# Patient Record
Sex: FEMALE | Race: WHITE | Employment: OTHER | URBAN - NONMETROPOLITAN AREA
[De-identification: names, ages, dates, MRNs, and addresses within clinical notes are randomized per-mention and may not be internally consistent; named-entity substitution may affect disease eponyms.]

---

## 2019-10-17 ENCOUNTER — HOSPITAL ENCOUNTER (OUTPATIENT)
Dept: PREADMISSION TESTING | Age: 79
Discharge: HOME | End: 2019-10-21
Payer: MEDICARE

## 2019-10-17 VITALS — HEIGHT: 62 IN | BODY MASS INDEX: 31.28 KG/M2 | WEIGHT: 170 LBS

## 2019-10-17 LAB
ANION GAP SERPL CALCULATED.3IONS-SCNC: 15 MMOL/L (ref 7–19)
APTT: 28.1 SEC (ref 26–36.2)
BACTERIA: ABNORMAL /HPF
BASOPHILS ABSOLUTE: 0.1 K/UL (ref 0–0.2)
BASOPHILS RELATIVE PERCENT: 0.9 % (ref 0–1)
BILIRUBIN URINE: NEGATIVE
BLOOD, URINE: NEGATIVE
BUN BLDV-MCNC: 26 MG/DL (ref 8–23)
CALCIUM SERPL-MCNC: 10.3 MG/DL (ref 8.8–10.2)
CHLORIDE BLD-SCNC: 103 MMOL/L (ref 98–111)
CLARITY: ABNORMAL
CO2: 25 MMOL/L (ref 22–29)
COLOR: YELLOW
CREAT SERPL-MCNC: 1.1 MG/DL (ref 0.5–0.9)
EOSINOPHILS ABSOLUTE: 0.2 K/UL (ref 0–0.6)
EOSINOPHILS RELATIVE PERCENT: 2.5 % (ref 0–5)
EPITHELIAL CELLS, UA: 4 /HPF (ref 0–5)
GFR NON-AFRICAN AMERICAN: 48
GLUCOSE BLD-MCNC: 142 MG/DL (ref 74–109)
GLUCOSE URINE: NEGATIVE MG/DL
HCT VFR BLD CALC: 40.7 % (ref 37–47)
HEMOGLOBIN: 13.5 G/DL (ref 12–16)
HYALINE CASTS: 5 /HPF (ref 0–8)
IMMATURE GRANULOCYTES #: 0 K/UL
INR BLD: 1.02 (ref 0.88–1.18)
KETONES, URINE: NEGATIVE MG/DL
LEUKOCYTE ESTERASE, URINE: ABNORMAL
LYMPHOCYTES ABSOLUTE: 2 K/UL (ref 1.1–4.5)
LYMPHOCYTES RELATIVE PERCENT: 26.5 % (ref 20–40)
MCH RBC QN AUTO: 30.5 PG (ref 27–31)
MCHC RBC AUTO-ENTMCNC: 33.2 G/DL (ref 33–37)
MCV RBC AUTO: 91.9 FL (ref 81–99)
MONOCYTES ABSOLUTE: 0.5 K/UL (ref 0–0.9)
MONOCYTES RELATIVE PERCENT: 7.2 % (ref 0–10)
NEUTROPHILS ABSOLUTE: 4.7 K/UL (ref 1.5–7.5)
NEUTROPHILS RELATIVE PERCENT: 62.6 % (ref 50–65)
NITRITE, URINE: NEGATIVE
PDW BLD-RTO: 12.7 % (ref 11.5–14.5)
PH UA: 6 (ref 5–8)
PLATELET # BLD: 280 K/UL (ref 130–400)
PMV BLD AUTO: 10.7 FL (ref 9.4–12.3)
POTASSIUM SERPL-SCNC: 3.6 MMOL/L (ref 3.5–5)
PROTEIN UA: NEGATIVE MG/DL
PROTHROMBIN TIME: 12.8 SEC (ref 12–14.6)
RBC # BLD: 4.43 M/UL (ref 4.2–5.4)
RBC UA: 4 /HPF (ref 0–4)
SODIUM BLD-SCNC: 143 MMOL/L (ref 136–145)
SPECIFIC GRAVITY UA: 1.02 (ref 1–1.03)
URINE REFLEX TO CULTURE: YES
UROBILINOGEN, URINE: 0.2 E.U./DL
WBC # BLD: 7.5 K/UL (ref 4.8–10.8)
WBC UA: 53 /HPF (ref 0–5)

## 2019-10-17 PROCEDURE — 87077 CULTURE AEROBIC IDENTIFY: CPT

## 2019-10-17 PROCEDURE — 85610 PROTHROMBIN TIME: CPT

## 2019-10-17 PROCEDURE — 87086 URINE CULTURE/COLONY COUNT: CPT

## 2019-10-17 PROCEDURE — 87186 SC STD MICRODIL/AGAR DIL: CPT

## 2019-10-17 PROCEDURE — 93005 ELECTROCARDIOGRAM TRACING: CPT | Performed by: ORTHOPAEDIC SURGERY

## 2019-10-17 PROCEDURE — 81001 URINALYSIS AUTO W/SCOPE: CPT

## 2019-10-17 PROCEDURE — 87081 CULTURE SCREEN ONLY: CPT

## 2019-10-17 PROCEDURE — 85730 THROMBOPLASTIN TIME PARTIAL: CPT

## 2019-10-17 PROCEDURE — 80048 BASIC METABOLIC PNL TOTAL CA: CPT

## 2019-10-17 PROCEDURE — 85025 COMPLETE CBC W/AUTO DIFF WBC: CPT

## 2019-10-17 RX ORDER — VITAMIN E 268 MG
400 CAPSULE ORAL DAILY
COMMUNITY

## 2019-10-17 RX ORDER — LANOLIN ALCOHOL/MO/W.PET/CERES
500 CREAM (GRAM) TOPICAL NIGHTLY
COMMUNITY
End: 2020-01-29 | Stop reason: ALTCHOICE

## 2019-10-17 RX ORDER — NICOTINE 14MG/24HR
250 PATCH, TRANSDERMAL 24 HOURS TRANSDERMAL DAILY
COMMUNITY

## 2019-10-17 RX ORDER — VENLAFAXINE HYDROCHLORIDE 150 MG/1
150 TABLET, EXTENDED RELEASE ORAL
COMMUNITY

## 2019-10-17 RX ORDER — M-VIT,TX,IRON,MINS/CALC/FOLIC 27MG-0.4MG
1 TABLET ORAL DAILY
COMMUNITY

## 2019-10-17 RX ORDER — ECHINACEA 400 MG
1000 CAPSULE ORAL DAILY
COMMUNITY

## 2019-10-17 RX ORDER — HYDROCHLOROTHIAZIDE 25 MG/1
25 TABLET ORAL DAILY
COMMUNITY

## 2019-10-17 RX ORDER — ACETAMINOPHEN 500 MG
500 TABLET ORAL EVERY 6 HOURS PRN
COMMUNITY

## 2019-10-17 RX ORDER — BENAZEPRIL HYDROCHLORIDE 20 MG/1
20 TABLET ORAL DAILY
COMMUNITY
End: 2020-01-29 | Stop reason: ALTCHOICE

## 2019-10-17 RX ORDER — AMLODIPINE BESYLATE 10 MG/1
10 TABLET ORAL DAILY
COMMUNITY

## 2019-10-17 RX ORDER — ASPIRIN 325 MG
325 TABLET ORAL DAILY
Status: ON HOLD | COMMUNITY
End: 2019-11-01 | Stop reason: HOSPADM

## 2019-10-17 RX ORDER — COVID-19 ANTIGEN TEST
220 KIT MISCELLANEOUS 2 TIMES DAILY
COMMUNITY
End: 2020-01-29

## 2019-10-17 RX ORDER — ZOLPIDEM TARTRATE 5 MG/1
5 TABLET ORAL NIGHTLY
COMMUNITY

## 2019-10-18 LAB
EKG P AXIS: -35 DEGREES
EKG P-R INTERVAL: 212 MS
EKG Q-T INTERVAL: 408 MS
EKG QRS DURATION: 84 MS
EKG QTC CALCULATION (BAZETT): 425 MS
EKG T AXIS: 57 DEGREES
MRSA CULTURE ONLY: ABNORMAL
ORGANISM: ABNORMAL

## 2019-10-18 PROCEDURE — 93010 ELECTROCARDIOGRAM REPORT: CPT | Performed by: INTERNAL MEDICINE

## 2019-10-19 LAB
ORGANISM: ABNORMAL
URINE CULTURE, ROUTINE: ABNORMAL
URINE CULTURE, ROUTINE: ABNORMAL

## 2019-10-31 ENCOUNTER — HOSPITAL ENCOUNTER (INPATIENT)
Age: 79
LOS: 1 days | Discharge: HOME | DRG: 470 | End: 2019-11-01
Attending: ORTHOPAEDIC SURGERY | Admitting: ORTHOPAEDIC SURGERY
Payer: MEDICARE

## 2019-10-31 ENCOUNTER — ANESTHESIA (OUTPATIENT)
Dept: OPERATING ROOM | Age: 79
DRG: 470 | End: 2019-10-31
Payer: MEDICARE

## 2019-10-31 ENCOUNTER — ANESTHESIA EVENT (OUTPATIENT)
Dept: OPERATING ROOM | Age: 79
DRG: 470 | End: 2019-10-31
Payer: MEDICARE

## 2019-10-31 VITALS — DIASTOLIC BLOOD PRESSURE: 48 MMHG | SYSTOLIC BLOOD PRESSURE: 86 MMHG | OXYGEN SATURATION: 95 % | TEMPERATURE: 96.8 F

## 2019-10-31 PROBLEM — M17.12 PRIMARY OSTEOARTHRITIS OF LEFT KNEE: Status: ACTIVE | Noted: 2019-10-31

## 2019-10-31 LAB
ABO/RH: NORMAL
ANTIBODY SCREEN: NORMAL

## 2019-10-31 PROCEDURE — 3600000015 HC SURGERY LEVEL 5 ADDTL 15MIN: Performed by: ORTHOPAEDIC SURGERY

## 2019-10-31 PROCEDURE — 97161 PT EVAL LOW COMPLEX 20 MIN: CPT

## 2019-10-31 PROCEDURE — 2580000003 HC RX 258: Performed by: ORTHOPAEDIC SURGERY

## 2019-10-31 PROCEDURE — 3700000000 HC ANESTHESIA ATTENDED CARE: Performed by: ORTHOPAEDIC SURGERY

## 2019-10-31 PROCEDURE — 86850 RBC ANTIBODY SCREEN: CPT

## 2019-10-31 PROCEDURE — 2709999900 HC NON-CHARGEABLE SUPPLY: Performed by: ORTHOPAEDIC SURGERY

## 2019-10-31 PROCEDURE — 1210000000 HC MED SURG R&B

## 2019-10-31 PROCEDURE — C1776 JOINT DEVICE (IMPLANTABLE): HCPCS | Performed by: ORTHOPAEDIC SURGERY

## 2019-10-31 PROCEDURE — 0SRD0J9 REPLACEMENT OF LEFT KNEE JOINT WITH SYNTHETIC SUBSTITUTE, CEMENTED, OPEN APPROACH: ICD-10-PCS | Performed by: ORTHOPAEDIC SURGERY

## 2019-10-31 PROCEDURE — 6370000000 HC RX 637 (ALT 250 FOR IP): Performed by: ORTHOPAEDIC SURGERY

## 2019-10-31 PROCEDURE — 2500000003 HC RX 250 WO HCPCS: Performed by: ANESTHESIOLOGY

## 2019-10-31 PROCEDURE — 6360000002 HC RX W HCPCS: Performed by: ORTHOPAEDIC SURGERY

## 2019-10-31 PROCEDURE — C1713 ANCHOR/SCREW BN/BN,TIS/BN: HCPCS | Performed by: ORTHOPAEDIC SURGERY

## 2019-10-31 PROCEDURE — 86901 BLOOD TYPING SEROLOGIC RH(D): CPT

## 2019-10-31 PROCEDURE — 2700000000 HC OXYGEN THERAPY PER DAY

## 2019-10-31 PROCEDURE — 7100000000 HC PACU RECOVERY - FIRST 15 MIN: Performed by: ORTHOPAEDIC SURGERY

## 2019-10-31 PROCEDURE — 36415 COLL VENOUS BLD VENIPUNCTURE: CPT

## 2019-10-31 PROCEDURE — 2500000003 HC RX 250 WO HCPCS: Performed by: NURSE ANESTHETIST, CERTIFIED REGISTERED

## 2019-10-31 PROCEDURE — 86900 BLOOD TYPING SEROLOGIC ABO: CPT

## 2019-10-31 PROCEDURE — 3600000005 HC SURGERY LEVEL 5 BASE: Performed by: ORTHOPAEDIC SURGERY

## 2019-10-31 PROCEDURE — 6360000002 HC RX W HCPCS: Performed by: NURSE ANESTHETIST, CERTIFIED REGISTERED

## 2019-10-31 PROCEDURE — 97530 THERAPEUTIC ACTIVITIES: CPT

## 2019-10-31 PROCEDURE — 7100000001 HC PACU RECOVERY - ADDTL 15 MIN: Performed by: ORTHOPAEDIC SURGERY

## 2019-10-31 PROCEDURE — 6360000002 HC RX W HCPCS: Performed by: ANESTHESIOLOGY

## 2019-10-31 PROCEDURE — 3700000001 HC ADD 15 MINUTES (ANESTHESIA): Performed by: ORTHOPAEDIC SURGERY

## 2019-10-31 PROCEDURE — 76942 ECHO GUIDE FOR BIOPSY: CPT | Performed by: NURSE ANESTHETIST, CERTIFIED REGISTERED

## 2019-10-31 PROCEDURE — 2500000003 HC RX 250 WO HCPCS: Performed by: ORTHOPAEDIC SURGERY

## 2019-10-31 DEVICE — COMPONENT FEM SZ 5 NAR L KNEE CO CHROM CEM POST STBL COR: Type: IMPLANTABLE DEVICE | Site: KNEE | Status: FUNCTIONAL

## 2019-10-31 DEVICE — PSN TIB STM 5 DEG SZ C L: Type: IMPLANTABLE DEVICE | Site: KNEE | Status: FUNCTIONAL

## 2019-10-31 DEVICE — CEMENT BNE 40GM HI VISC RADPQ FOR REV SURG: Type: IMPLANTABLE DEVICE | Site: KNEE | Status: FUNCTIONAL

## 2019-10-31 DEVICE — IMPL KNEE ASF PSN CPS 10MM VE L 3-5 CD: Type: IMPLANTABLE DEVICE | Site: KNEE | Status: FUNCTIONAL

## 2019-10-31 DEVICE — EXTENSION STEM L30MM DIA14MM KNEE TAPR CEM PERSONA: Type: IMPLANTABLE DEVICE | Site: KNEE | Status: FUNCTIONAL

## 2019-10-31 DEVICE — NEXGEN ALL-POLY PATELLA 26MM: Type: IMPLANTABLE DEVICE | Site: KNEE | Status: FUNCTIONAL

## 2019-10-31 RX ORDER — MORPHINE SULFATE 4 MG/ML
4 INJECTION, SOLUTION INTRAMUSCULAR; INTRAVENOUS
Status: DISCONTINUED | OUTPATIENT
Start: 2019-10-31 | End: 2019-11-01 | Stop reason: HOSPADM

## 2019-10-31 RX ORDER — SODIUM CHLORIDE 0.9 % (FLUSH) 0.9 %
10 SYRINGE (ML) INJECTION PRN
Status: DISCONTINUED | OUTPATIENT
Start: 2019-10-31 | End: 2019-11-01 | Stop reason: HOSPADM

## 2019-10-31 RX ORDER — MIDAZOLAM HYDROCHLORIDE 1 MG/ML
2 INJECTION INTRAMUSCULAR; INTRAVENOUS ONCE
Status: COMPLETED | OUTPATIENT
Start: 2019-10-31 | End: 2019-10-31

## 2019-10-31 RX ORDER — MIDAZOLAM HYDROCHLORIDE 1 MG/ML
2 INJECTION INTRAMUSCULAR; INTRAVENOUS
Status: DISCONTINUED | OUTPATIENT
Start: 2019-10-31 | End: 2019-10-31 | Stop reason: HOSPADM

## 2019-10-31 RX ORDER — FENTANYL CITRATE 50 UG/ML
25 INJECTION, SOLUTION INTRAMUSCULAR; INTRAVENOUS
Status: DISCONTINUED | OUTPATIENT
Start: 2019-10-31 | End: 2019-10-31 | Stop reason: HOSPADM

## 2019-10-31 RX ORDER — VITAMIN B COMPLEX
1000 TABLET ORAL DAILY
Status: DISCONTINUED | OUTPATIENT
Start: 2019-10-31 | End: 2019-11-01 | Stop reason: HOSPADM

## 2019-10-31 RX ORDER — LIDOCAINE HYDROCHLORIDE 10 MG/ML
1 INJECTION, SOLUTION EPIDURAL; INFILTRATION; INTRACAUDAL; PERINEURAL
Status: DISCONTINUED | OUTPATIENT
Start: 2019-10-31 | End: 2019-10-31 | Stop reason: HOSPADM

## 2019-10-31 RX ORDER — OXYCODONE HCL 10 MG/1
10 TABLET, FILM COATED, EXTENDED RELEASE ORAL
Status: COMPLETED | OUTPATIENT
Start: 2019-10-31 | End: 2019-10-31

## 2019-10-31 RX ORDER — DEXAMETHASONE SODIUM PHOSPHATE 10 MG/ML
INJECTION INTRAMUSCULAR; INTRAVENOUS PRN
Status: DISCONTINUED | OUTPATIENT
Start: 2019-10-31 | End: 2019-10-31 | Stop reason: SDUPTHER

## 2019-10-31 RX ORDER — ROPIVACAINE HYDROCHLORIDE 2 MG/ML
INJECTION, SOLUTION EPIDURAL; INFILTRATION; PERINEURAL PRN
Status: DISCONTINUED | OUTPATIENT
Start: 2019-10-31 | End: 2019-10-31 | Stop reason: HOSPADM

## 2019-10-31 RX ORDER — IBUPROFEN 400 MG/1
400 TABLET ORAL
Status: DISCONTINUED | OUTPATIENT
Start: 2019-10-31 | End: 2019-11-01

## 2019-10-31 RX ORDER — AMLODIPINE BESYLATE 10 MG/1
10 TABLET ORAL DAILY
Status: DISCONTINUED | OUTPATIENT
Start: 2019-10-31 | End: 2019-11-01 | Stop reason: HOSPADM

## 2019-10-31 RX ORDER — FENTANYL CITRATE 50 UG/ML
INJECTION, SOLUTION INTRAMUSCULAR; INTRAVENOUS PRN
Status: DISCONTINUED | OUTPATIENT
Start: 2019-10-31 | End: 2019-10-31 | Stop reason: SDUPTHER

## 2019-10-31 RX ORDER — SCOLOPAMINE TRANSDERMAL SYSTEM 1 MG/1
1 PATCH, EXTENDED RELEASE TRANSDERMAL ONCE
Status: DISCONTINUED | OUTPATIENT
Start: 2019-10-31 | End: 2019-10-31

## 2019-10-31 RX ORDER — VENLAFAXINE HYDROCHLORIDE 75 MG/1
150 CAPSULE, EXTENDED RELEASE ORAL
Status: DISCONTINUED | OUTPATIENT
Start: 2019-11-01 | End: 2019-11-01 | Stop reason: HOSPADM

## 2019-10-31 RX ORDER — SODIUM CHLORIDE 0.9 % (FLUSH) 0.9 %
10 SYRINGE (ML) INJECTION PRN
Status: DISCONTINUED | OUTPATIENT
Start: 2019-10-31 | End: 2019-10-31 | Stop reason: HOSPADM

## 2019-10-31 RX ORDER — ZOLPIDEM TARTRATE 5 MG/1
5 TABLET ORAL NIGHTLY
Status: DISCONTINUED | OUTPATIENT
Start: 2019-10-31 | End: 2019-11-01 | Stop reason: HOSPADM

## 2019-10-31 RX ORDER — ACETAMINOPHEN 325 MG/1
650 TABLET ORAL EVERY 6 HOURS
Status: DISCONTINUED | OUTPATIENT
Start: 2019-10-31 | End: 2019-11-01 | Stop reason: HOSPADM

## 2019-10-31 RX ORDER — BUPIVACAINE HYDROCHLORIDE 5 MG/ML
INJECTION, SOLUTION EPIDURAL; INTRACAUDAL
Status: COMPLETED | OUTPATIENT
Start: 2019-10-31 | End: 2019-10-31

## 2019-10-31 RX ORDER — ACETAMINOPHEN 500 MG
1000 TABLET ORAL ONCE
Status: COMPLETED | OUTPATIENT
Start: 2019-10-31 | End: 2019-10-31

## 2019-10-31 RX ORDER — ONDANSETRON 2 MG/ML
4 INJECTION INTRAMUSCULAR; INTRAVENOUS EVERY 6 HOURS PRN
Status: DISCONTINUED | OUTPATIENT
Start: 2019-10-31 | End: 2019-11-01 | Stop reason: HOSPADM

## 2019-10-31 RX ORDER — OXYCODONE HYDROCHLORIDE 5 MG/1
10 TABLET ORAL EVERY 4 HOURS PRN
Status: DISCONTINUED | OUTPATIENT
Start: 2019-10-31 | End: 2019-11-01 | Stop reason: HOSPADM

## 2019-10-31 RX ORDER — SODIUM CHLORIDE 9 MG/ML
INJECTION, SOLUTION INTRAVENOUS CONTINUOUS
Status: DISCONTINUED | OUTPATIENT
Start: 2019-10-31 | End: 2019-11-01 | Stop reason: HOSPADM

## 2019-10-31 RX ORDER — CELECOXIB 100 MG/1
100 CAPSULE ORAL ONCE
Status: COMPLETED | OUTPATIENT
Start: 2019-10-31 | End: 2019-10-31

## 2019-10-31 RX ORDER — LANOLIN ALCOHOL/MO/W.PET/CERES
500 CREAM (GRAM) TOPICAL NIGHTLY
Status: DISCONTINUED | OUTPATIENT
Start: 2019-10-31 | End: 2019-11-01 | Stop reason: HOSPADM

## 2019-10-31 RX ORDER — MEPERIDINE HYDROCHLORIDE 50 MG/ML
12.5 INJECTION INTRAMUSCULAR; INTRAVENOUS; SUBCUTANEOUS EVERY 5 MIN PRN
Status: DISCONTINUED | OUTPATIENT
Start: 2019-10-31 | End: 2019-10-31 | Stop reason: HOSPADM

## 2019-10-31 RX ORDER — MORPHINE SULFATE 4 MG/ML
2 INJECTION, SOLUTION INTRAMUSCULAR; INTRAVENOUS
Status: DISCONTINUED | OUTPATIENT
Start: 2019-10-31 | End: 2019-11-01 | Stop reason: HOSPADM

## 2019-10-31 RX ORDER — ONDANSETRON 2 MG/ML
INJECTION INTRAMUSCULAR; INTRAVENOUS PRN
Status: DISCONTINUED | OUTPATIENT
Start: 2019-10-31 | End: 2019-10-31 | Stop reason: SDUPTHER

## 2019-10-31 RX ORDER — LACTOBACILLUS RHAMNOSUS GG 10B CELL
1 CAPSULE ORAL DAILY
Status: DISCONTINUED | OUTPATIENT
Start: 2019-10-31 | End: 2019-11-01 | Stop reason: HOSPADM

## 2019-10-31 RX ORDER — DIPHENHYDRAMINE HYDROCHLORIDE 50 MG/ML
12.5 INJECTION INTRAMUSCULAR; INTRAVENOUS
Status: DISCONTINUED | OUTPATIENT
Start: 2019-10-31 | End: 2019-10-31 | Stop reason: HOSPADM

## 2019-10-31 RX ORDER — SODIUM CHLORIDE 0.9 % (FLUSH) 0.9 %
10 SYRINGE (ML) INJECTION EVERY 12 HOURS SCHEDULED
Status: DISCONTINUED | OUTPATIENT
Start: 2019-10-31 | End: 2019-11-01 | Stop reason: HOSPADM

## 2019-10-31 RX ORDER — LISINOPRIL 5 MG/1
5 TABLET ORAL DAILY
Status: DISCONTINUED | OUTPATIENT
Start: 2019-10-31 | End: 2019-11-01 | Stop reason: HOSPADM

## 2019-10-31 RX ORDER — FENTANYL CITRATE 50 UG/ML
50 INJECTION, SOLUTION INTRAMUSCULAR; INTRAVENOUS
Status: DISCONTINUED | OUTPATIENT
Start: 2019-10-31 | End: 2019-10-31 | Stop reason: HOSPADM

## 2019-10-31 RX ORDER — BUPIVACAINE HYDROCHLORIDE 7.5 MG/ML
INJECTION, SOLUTION INTRASPINAL PRN
Status: DISCONTINUED | OUTPATIENT
Start: 2019-10-31 | End: 2019-10-31 | Stop reason: SDUPTHER

## 2019-10-31 RX ORDER — 0.9 % SODIUM CHLORIDE 0.9 %
500 INTRAVENOUS SOLUTION INTRAVENOUS PRN
Status: DISCONTINUED | OUTPATIENT
Start: 2019-10-31 | End: 2019-11-01 | Stop reason: HOSPADM

## 2019-10-31 RX ORDER — OXYCODONE HYDROCHLORIDE 5 MG/1
5 TABLET ORAL EVERY 4 HOURS PRN
Status: DISCONTINUED | OUTPATIENT
Start: 2019-10-31 | End: 2019-11-01 | Stop reason: HOSPADM

## 2019-10-31 RX ORDER — DOCUSATE SODIUM 100 MG/1
100 CAPSULE, LIQUID FILLED ORAL 2 TIMES DAILY
Status: DISCONTINUED | OUTPATIENT
Start: 2019-10-31 | End: 2019-11-01 | Stop reason: HOSPADM

## 2019-10-31 RX ORDER — SODIUM CHLORIDE, SODIUM LACTATE, POTASSIUM CHLORIDE, CALCIUM CHLORIDE 600; 310; 30; 20 MG/100ML; MG/100ML; MG/100ML; MG/100ML
INJECTION, SOLUTION INTRAVENOUS CONTINUOUS
Status: DISCONTINUED | OUTPATIENT
Start: 2019-10-31 | End: 2019-10-31 | Stop reason: SDUPTHER

## 2019-10-31 RX ORDER — NAPROXEN 250 MG/1
250 TABLET ORAL 2 TIMES DAILY
Status: DISCONTINUED | OUTPATIENT
Start: 2019-10-31 | End: 2019-10-31 | Stop reason: SDUPTHER

## 2019-10-31 RX ORDER — PROPOFOL 10 MG/ML
INJECTION, EMULSION INTRAVENOUS CONTINUOUS PRN
Status: DISCONTINUED | OUTPATIENT
Start: 2019-10-31 | End: 2019-10-31 | Stop reason: SDUPTHER

## 2019-10-31 RX ORDER — LIDOCAINE HYDROCHLORIDE 10 MG/ML
INJECTION, SOLUTION INFILTRATION; PERINEURAL PRN
Status: DISCONTINUED | OUTPATIENT
Start: 2019-10-31 | End: 2019-10-31 | Stop reason: SDUPTHER

## 2019-10-31 RX ORDER — ECHINACEA 400 MG
1000 CAPSULE ORAL DAILY
Status: DISCONTINUED | OUTPATIENT
Start: 2019-10-31 | End: 2019-10-31 | Stop reason: RX

## 2019-10-31 RX ORDER — PROPOFOL 10 MG/ML
INJECTION, EMULSION INTRAVENOUS PRN
Status: DISCONTINUED | OUTPATIENT
Start: 2019-10-31 | End: 2019-10-31

## 2019-10-31 RX ORDER — SENNA AND DOCUSATE SODIUM 50; 8.6 MG/1; MG/1
1 TABLET, FILM COATED ORAL 2 TIMES DAILY
Status: DISCONTINUED | OUTPATIENT
Start: 2019-10-31 | End: 2019-11-01 | Stop reason: HOSPADM

## 2019-10-31 RX ORDER — HYDROCHLOROTHIAZIDE 25 MG/1
25 TABLET ORAL DAILY
Status: DISCONTINUED | OUTPATIENT
Start: 2019-10-31 | End: 2019-11-01 | Stop reason: HOSPADM

## 2019-10-31 RX ORDER — ENALAPRILAT 2.5 MG/2ML
1.25 INJECTION INTRAVENOUS
Status: DISCONTINUED | OUTPATIENT
Start: 2019-10-31 | End: 2019-10-31 | Stop reason: HOSPADM

## 2019-10-31 RX ORDER — SODIUM CHLORIDE, SODIUM LACTATE, POTASSIUM CHLORIDE, CALCIUM CHLORIDE 600; 310; 30; 20 MG/100ML; MG/100ML; MG/100ML; MG/100ML
INJECTION, SOLUTION INTRAVENOUS CONTINUOUS
Status: DISCONTINUED | OUTPATIENT
Start: 2019-10-31 | End: 2019-10-31

## 2019-10-31 RX ORDER — LABETALOL 20 MG/4 ML (5 MG/ML) INTRAVENOUS SYRINGE
5 EVERY 10 MIN PRN
Status: DISCONTINUED | OUTPATIENT
Start: 2019-10-31 | End: 2019-10-31 | Stop reason: HOSPADM

## 2019-10-31 RX ORDER — SODIUM CHLORIDE 0.9 % (FLUSH) 0.9 %
10 SYRINGE (ML) INJECTION EVERY 12 HOURS SCHEDULED
Status: DISCONTINUED | OUTPATIENT
Start: 2019-10-31 | End: 2019-10-31 | Stop reason: HOSPADM

## 2019-10-31 RX ORDER — SODIUM CHLORIDE, SODIUM LACTATE, POTASSIUM CHLORIDE, CALCIUM CHLORIDE 600; 310; 30; 20 MG/100ML; MG/100ML; MG/100ML; MG/100ML
INJECTION, SOLUTION INTRAVENOUS CONTINUOUS
Status: DISCONTINUED | OUTPATIENT
Start: 2019-10-31 | End: 2019-11-01 | Stop reason: HOSPADM

## 2019-10-31 RX ORDER — ONDANSETRON 2 MG/ML
4 INJECTION INTRAMUSCULAR; INTRAVENOUS
Status: DISCONTINUED | OUTPATIENT
Start: 2019-10-31 | End: 2019-10-31 | Stop reason: HOSPADM

## 2019-10-31 RX ORDER — EPHEDRINE SULFATE 50 MG/ML
INJECTION, SOLUTION INTRAVENOUS PRN
Status: DISCONTINUED | OUTPATIENT
Start: 2019-10-31 | End: 2019-10-31 | Stop reason: SDUPTHER

## 2019-10-31 RX ORDER — TRANEXAMIC ACID 650 1/1
1950 TABLET ORAL
Status: COMPLETED | OUTPATIENT
Start: 2019-10-31 | End: 2019-10-31

## 2019-10-31 RX ORDER — HYDRALAZINE HYDROCHLORIDE 20 MG/ML
5 INJECTION INTRAMUSCULAR; INTRAVENOUS EVERY 10 MIN PRN
Status: DISCONTINUED | OUTPATIENT
Start: 2019-10-31 | End: 2019-10-31 | Stop reason: HOSPADM

## 2019-10-31 RX ORDER — M-VIT,TX,IRON,MINS/CALC/FOLIC 27MG-0.4MG
1 TABLET ORAL DAILY
Status: DISCONTINUED | OUTPATIENT
Start: 2019-10-31 | End: 2019-11-01 | Stop reason: HOSPADM

## 2019-10-31 RX ADMIN — BUPIVACAINE HYDROCHLORIDE 20 ML: 5 INJECTION, SOLUTION EPIDURAL; INTRACAUDAL; PERINEURAL at 07:16

## 2019-10-31 RX ADMIN — PHENYLEPHRINE HYDROCHLORIDE 100 MCG: 10 INJECTION INTRAVENOUS at 08:28

## 2019-10-31 RX ADMIN — BUPIVACAINE HYDROCHLORIDE 1.4 ML: 5 INJECTION, SOLUTION EPIDURAL; INTRACAUDAL; PERINEURAL at 07:53

## 2019-10-31 RX ADMIN — OXYCODONE HYDROCHLORIDE 10 MG: 5 TABLET ORAL at 12:52

## 2019-10-31 RX ADMIN — IBUPROFEN 400 MG: 400 TABLET, FILM COATED ORAL at 12:52

## 2019-10-31 RX ADMIN — MORPHINE SULFATE 2 MG: 4 INJECTION INTRAVENOUS at 20:26

## 2019-10-31 RX ADMIN — MORPHINE SULFATE 2 MG: 4 INJECTION INTRAVENOUS at 16:17

## 2019-10-31 RX ADMIN — SENNOSIDES AND DOCUSATE SODIUM 1 TABLET: 8.6; 5 TABLET ORAL at 12:51

## 2019-10-31 RX ADMIN — SODIUM CHLORIDE, SODIUM LACTATE, POTASSIUM CHLORIDE, AND CALCIUM CHLORIDE: 600; 310; 30; 20 INJECTION, SOLUTION INTRAVENOUS at 12:54

## 2019-10-31 RX ADMIN — MIDAZOLAM 1 MG: 1 INJECTION INTRAMUSCULAR; INTRAVENOUS at 07:12

## 2019-10-31 RX ADMIN — DEXAMETHASONE SODIUM PHOSPHATE 10 MG: 10 INJECTION INTRAMUSCULAR; INTRAVENOUS at 08:05

## 2019-10-31 RX ADMIN — PHENYLEPHRINE HYDROCHLORIDE 100 MCG: 10 INJECTION INTRAVENOUS at 08:25

## 2019-10-31 RX ADMIN — PHENYLEPHRINE HYDROCHLORIDE 100 MCG: 10 INJECTION INTRAVENOUS at 08:22

## 2019-10-31 RX ADMIN — IBUPROFEN 400 MG: 400 TABLET, FILM COATED ORAL at 16:20

## 2019-10-31 RX ADMIN — PROPOFOL 160 MCG/KG/MIN: 10 INJECTION, EMULSION INTRAVENOUS at 07:51

## 2019-10-31 RX ADMIN — ASPIRIN 325 MG: 325 TABLET, DELAYED RELEASE ORAL at 20:27

## 2019-10-31 RX ADMIN — SENNOSIDES AND DOCUSATE SODIUM 1 TABLET: 8.6; 5 TABLET ORAL at 20:27

## 2019-10-31 RX ADMIN — Medication 2 G: at 23:55

## 2019-10-31 RX ADMIN — EPHEDRINE SULFATE 10 MG: 50 INJECTION INTRAMUSCULAR; INTRAVENOUS; SUBCUTANEOUS at 08:07

## 2019-10-31 RX ADMIN — FENTANYL CITRATE 50 MCG: 50 INJECTION INTRAMUSCULAR; INTRAVENOUS at 07:41

## 2019-10-31 RX ADMIN — MULTIPLE VITAMINS W/ MINERALS TAB 1 TABLET: TAB at 12:52

## 2019-10-31 RX ADMIN — CELECOXIB 100 MG: 100 CAPSULE ORAL at 06:33

## 2019-10-31 RX ADMIN — Medication 500 MG: at 20:26

## 2019-10-31 RX ADMIN — DOCUSATE SODIUM 100 MG: 100 CAPSULE, LIQUID FILLED ORAL at 20:27

## 2019-10-31 RX ADMIN — ACETAMINOPHEN 650 MG: 325 TABLET ORAL at 12:52

## 2019-10-31 RX ADMIN — DOCUSATE SODIUM 100 MG: 100 CAPSULE, LIQUID FILLED ORAL at 12:54

## 2019-10-31 RX ADMIN — TRANEXAMIC ACID 1950 MG: 650 TABLET ORAL at 06:33

## 2019-10-31 RX ADMIN — ASPIRIN 325 MG: 325 TABLET, DELAYED RELEASE ORAL at 12:51

## 2019-10-31 RX ADMIN — ACETAMINOPHEN 650 MG: 325 TABLET ORAL at 16:20

## 2019-10-31 RX ADMIN — PHENYLEPHRINE HYDROCHLORIDE 100 MCG: 10 INJECTION INTRAVENOUS at 08:10

## 2019-10-31 RX ADMIN — EPHEDRINE SULFATE 10 MG: 50 INJECTION INTRAMUSCULAR; INTRAVENOUS; SUBCUTANEOUS at 08:04

## 2019-10-31 RX ADMIN — SODIUM CHLORIDE, SODIUM LACTATE, POTASSIUM CHLORIDE, AND CALCIUM CHLORIDE: 600; 310; 30; 20 INJECTION, SOLUTION INTRAVENOUS at 06:34

## 2019-10-31 RX ADMIN — OXYCODONE HYDROCHLORIDE 10 MG: 10 TABLET, FILM COATED, EXTENDED RELEASE ORAL at 06:33

## 2019-10-31 RX ADMIN — SODIUM CHLORIDE, SODIUM LACTATE, POTASSIUM CHLORIDE, AND CALCIUM CHLORIDE: 600; 310; 30; 20 INJECTION, SOLUTION INTRAVENOUS at 07:39

## 2019-10-31 RX ADMIN — Medication 2 G: at 16:19

## 2019-10-31 RX ADMIN — ACETAMINOPHEN 1000 MG: 500 TABLET ORAL at 06:33

## 2019-10-31 RX ADMIN — Medication 2 G: at 08:03

## 2019-10-31 RX ADMIN — OXYCODONE HYDROCHLORIDE 10 MG: 5 TABLET ORAL at 18:35

## 2019-10-31 RX ADMIN — OXYCODONE HYDROCHLORIDE 10 MG: 5 TABLET ORAL at 22:31

## 2019-10-31 RX ADMIN — FENTANYL CITRATE 50 MCG: 50 INJECTION INTRAMUSCULAR; INTRAVENOUS at 07:43

## 2019-10-31 RX ADMIN — ONDANSETRON HYDROCHLORIDE 4 MG: 2 INJECTION, SOLUTION INTRAMUSCULAR; INTRAVENOUS at 09:15

## 2019-10-31 RX ADMIN — PHENYLEPHRINE HYDROCHLORIDE 100 MCG: 10 INJECTION INTRAVENOUS at 08:32

## 2019-10-31 RX ADMIN — LIDOCAINE HYDROCHLORIDE 50 MG: 10 INJECTION, SOLUTION INFILTRATION; PERINEURAL at 07:56

## 2019-10-31 RX ADMIN — ZOLPIDEM TARTRATE 5 MG: 5 TABLET ORAL at 20:27

## 2019-10-31 RX ADMIN — VITAMIN D, TAB 1000IU (100/BT) 1000 UNITS: 25 TAB at 12:52

## 2019-10-31 RX ADMIN — ACETAMINOPHEN 650 MG: 325 TABLET ORAL at 22:31

## 2019-10-31 RX ADMIN — BUPIVACAINE HYDROCHLORIDE IN DEXTROSE 1.6 ML: 7.5 INJECTION, SOLUTION SUBARACHNOID at 07:48

## 2019-10-31 ASSESSMENT — PAIN DESCRIPTION - DESCRIPTORS
DESCRIPTORS: DISCOMFORT
DESCRIPTORS: SHOOTING;DISCOMFORT
DESCRIPTORS: DISCOMFORT

## 2019-10-31 ASSESSMENT — PAIN DESCRIPTION - ORIENTATION
ORIENTATION: LEFT

## 2019-10-31 ASSESSMENT — PAIN SCALES - GENERAL
PAINLEVEL_OUTOF10: 7
PAINLEVEL_OUTOF10: 3
PAINLEVEL_OUTOF10: 7
PAINLEVEL_OUTOF10: 4
PAINLEVEL_OUTOF10: 7
PAINLEVEL_OUTOF10: 3
PAINLEVEL_OUTOF10: 6
PAINLEVEL_OUTOF10: 5

## 2019-10-31 ASSESSMENT — PAIN DESCRIPTION - ONSET: ONSET: ON-GOING

## 2019-10-31 ASSESSMENT — PAIN DESCRIPTION - LOCATION
LOCATION: KNEE

## 2019-10-31 ASSESSMENT — PAIN DESCRIPTION - PAIN TYPE
TYPE: ACUTE PAIN
TYPE: SURGICAL PAIN

## 2019-10-31 ASSESSMENT — LIFESTYLE VARIABLES: SMOKING_STATUS: 0

## 2019-10-31 ASSESSMENT — ENCOUNTER SYMPTOMS: SHORTNESS OF BREATH: 0

## 2019-10-31 ASSESSMENT — PAIN DESCRIPTION - FREQUENCY: FREQUENCY: INTERMITTENT

## 2019-10-31 ASSESSMENT — PAIN DESCRIPTION - PROGRESSION: CLINICAL_PROGRESSION: GRADUALLY WORSENING

## 2019-10-31 ASSESSMENT — PAIN - FUNCTIONAL ASSESSMENT: PAIN_FUNCTIONAL_ASSESSMENT: PREVENTS OR INTERFERES SOME ACTIVE ACTIVITIES AND ADLS

## 2019-10-31 NOTE — H&P
[facility] ([facility]) Pre-Operative History and Physical    Patient Name: Luis Daniel Reese  : 1940        Chief Complaint:  left knee pain  History of Present Illness: This patient has had ongoing pain for several weeks/months that has been unresponsive to conservative care which has included injection, therapy, activity modification and presents now for surgery. Past Medical History:       Diagnosis Date    Cancer ([address]     melanoma    Hypertension     Retention of urine      Past Surgical History:       Procedure Laterality Date    CAROTID ENDARTERECTOMY Left 2003    [facility]    CARPAL TUNNEL RELEASE Left     HYSTERECTOMY      ROTATOR CUFF REPAIR Bilateral     VARICOSE VEIN SURGERY         Medications:   Prior to Admission medications    Medication Sig Start Date End Date Taking? Authorizing Provider   hydrochlorothiazide (HYDRODIURIL) 25 MG tablet Take 25 mg by mouth daily   Yes Historical Provider, MD   venlafaxine 150 MG extended release tablet Take 150 mg by mouth daily (with breakfast)   Yes Historical Provider, MD   benazepril (LOTENSIN) 20 MG tablet Take 20 mg by mouth daily   Yes Historical Provider, MD   amLODIPine (NORVASC) 10 MG tablet Take 10 mg by mouth daily   Yes Historical Provider, MD   zolpidem (AMBIEN) 5 MG tablet Take 5 mg by mouth nightly.    Yes Historical Provider, MD   Saccharomyces boulardii (PROBIOTIC) 250 MG CAPS Take 250 mg by mouth daily   Yes Historical Provider, MD   vitamin D (CHOLECALCIFEROL) 1000 UNIT TABS tablet Take 1,000 Units by mouth daily   Yes Historical Provider, MD   vitamin E 400 UNIT capsule Take 400 Units by mouth daily   Yes Historical Provider, MD   Multiple Vitamins-Minerals (THERAPEUTIC MULTIVITAMIN-MINERALS) tablet Take 1 tablet by mouth daily   Yes Historical Provider, MD   Naproxen Sodium (ALEVE) 220 MG CAPS Take 220 mg by mouth 2 times daily   Yes Historical Provider, MD   Flaxseed, Linseed, (FLAXSEED OIL) 1000 MG CAPS Take 1,000 mg by mouth daily   Yes motion and generalized tenderness. Neurovascular exam normal  SKIN: warm, dry with no rashes or lesions  LYMPH: No cervical or inguinal lymphadenopathy    RADIOLOGY: xrays of extremity show left severe knee arthritis  LABORATORY:    CBC :    Lab Results   Component Value Date    WBC 7.5 10/17/2019    HGB 13.5 10/17/2019    HCT 40.7 10/17/2019     10/17/2019     BMP:   Lab Results   Component Value Date     10/17/2019    K 3.6 10/17/2019     10/17/2019    CO2 25 10/17/2019    BUN 26 10/17/2019    CREATININE 1.1 10/17/2019    CALCIUM 10.3 10/17/2019    LABGLOM 48 10/17/2019    GLUCOSE 142 10/17/2019     PT/INR:    Lab Results   Component Value Date    PROTIME 12.8 10/17/2019    INR 1.02 10/17/2019     U/A:   Lab Results   Component Value Date    WBCUA 53 10/17/2019    RBCUA 4 10/17/2019    BACTERIA TRACE 10/17/2019     HgBA1c:  No components found for: HGBA1C    IMPRESSION:   left primary knee osteoarthritis. Most recent office note reviewed and there has been no change in health status. PLAN: left Knee replacement. I explained to the patient/family the patient's diagnosis and operative procedure in detail. They said they understood basically what was wrong and how I planned to fix it. They understand the expected recovery and the risks which include excessive bleeding, infection, reaction to anesthesia, nerve injury, stiffness, fracture, deformity and dislocation. They then signed an operative consent form. Provider:  Tahir Arroyo  Date: 10/31/2019

## 2019-10-31 NOTE — OP NOTE
TOTAL KNEE ARTHROPLASTY OPERATIVE NOTE    NAME OF SURGEON / : Santa Gomez MD  PATIENT:   Darcy Pulliam  Date: 10/31/2019        Time: 9:16 AM   Referring Physician: ________________________    PREOP DIAGNOSIS:  left Knee  Primary osteoarthritis   POSTOP DIAGNOSIS:  Same     PROCEDURE:    Left  Cemented Posterior Stabilized Knee arthroplasty    IMPLANTS:   Implant Name Type Inv. Item Serial No.  Lot No. LRB No. Used   CEMENT BONE R 1X40 US Cement CEMENT BONE R 1X40 US  [employer] [id number] Left 1   CEMENT BONE R 1X40 US Cement CEMENT BONE R 1X40 US  [employer] [id number] Left 1   IMPL KNEE ASF PSN CPS 10MM VE L 3-5 CD Knee IMPL KNEE ASF PSN CPS 10MM VE L 3-5 CD  [employer] [id number] Left 1   IMPL KNEE FEM PSN CMT CCR NRW SZ5 L Knee IMPL KNEE FEM PSN CMT CCR NRW SZ5 L  [employer] [id number] Left 1   IMPL KNEE TIB PERSONA LFT 5 DEG STEM SZ C Knee IMPL KNEE TIB PERSONA LFT 5 DEG STEM SZ C  [employer] [id number] Left 1   IMPL KNEE STEM EXT PSN HYB [id number] Knee IMPL KNEE STEM EXT PSN HYB [id number]  [employer] [id number] Left 1   IMPL KNEE PATELLA NXGN ALL POLY 7.5X26MM Knee IMPL KNEE PATELLA NXGN ALL POLY 7.5X26MM  [employer] [id number] Left 1       FINDINGS:  Preop ROM:  Full except for   - 5 degrees  extension  Alignment:    varus  Cartilage wear:  Medial - severe posterior  Lateral - mild  Pat-fem -mild  ACL -Intact    ASSISTANT: Usama Hawkins, certified first assistant. Helped with draping, exposure, retraction, essential steps of the procedure, and with wound closure. ANESTHESIA:  General  EBL:  500 mL  TOURNIQUET:  none  FLUIDS: See anesthesia record  BLOOD PRODUCTS:  None  COMPLICATIONS:  None  SPECIMEN:  None            INDICATIONS:  Patient presents for the above procedure having failed conservative treatment. Patient consents to the procedure above understanding the risks of bleeding, infection, anesthesia, nerve injury, stiffness, and blood clots.     PROCEDURE:  The patient was brought to the operating room and placed in a supine position on the operating table. Anesthesia as specified above was placed. An antiobiotic was given IV. The unoperated extremities were well padded. A tourniquet was placed around the proximal thigh. The lower extremity was prepped with chlorhexidene/alcohol and draped sterilely using ioband barriers. A time out was performed. An anterior knee incision was made and fasciocutaneous flaps were developed. A mini midvastus approach was made and the patella subluxed. The prepatellar fat pad, ACL, and the anterior horns of the menisci were excised. A starter drill was placed down the femoral shaft 1 cm anterior to the PCL origin. An alignment emmanuel was placed down the femoral shaft. The distal femoral cutting guide, set at 5 degrees of valgus was then placed over the alignment emmanuel, and pinned perpendicular to the AP axis. The cutting block was then set to remove an extra 2 mm of distal femur and the distal cut made. The medial bone cut thickness was 11 mm. Hohmans and a PCL retractor were placed around the tibia. The external alignment guide set to cut 10 mm off the intact side at 7° degrees posterior slope was pinned in place. I stepped back and verified that the guide followed the anterior cortex of the tibia to the ankle. The tibial cut was made. Its thickness was 11 mm. The tibial fragment and osteophytes were removed. Posterior meniscal horns were resected. The extension gap was satisfactory. The epicondylar and AP femoral axes were marked with electrocautery. Femoral trials were laid on the distal femur to estimate the appropriate size. The femoral sizer, with posterior paddles set at 3 degrees of external rotation, and an anterior stylus was placed. The sizer reading matched the size predicted by the trial.   The pinholes were drilled for the 4 in 1 femoral cutting block.   This block was impacted on the distal femur and sat flush with the proper rotation relative to the AP and epicondylar axes. A drill was advanced through the anterior slot to check for notching. The femoral block was fixed with medial and lateral screws and the remaining femoral cuts were made. Femoral osteophytes were removed with a rongeur. The flexion gap was satisfactory. Posterior femoral osteophytes were removed with a 3/4 inch curved osteotome and rongeur. The appropriate tibial post punch guide covered the tibia without overhang and sat flush. It was lined up with the median third of the tibial tubercle. The tibia was reamed, then the keel punched. The femoral trial was impacted onto the femur. The box cut was made first with a recip saw and finished with regular saw. The insert for the box was placed. A 10 mm thick, posterior stabilized tibial liner was snapped in place and ROM and stability were checked. The knee had full ROM and symmetric varus/valgus stability in flexion and extension after    *release of  NO RELEASES  * no extra resection was needed   *the appropriate size tibial liner was placed (see above)    Note: Stability to varus/valgus stress(mm):  Extension ( 2  ,  2 ) Mid Flexion (  2  ,  1  ) Flexion (  2  ,  3  )      The maximum patella thickness was 23 mm. The patella as resected with  an oscillating saw and consistent thickness verified with caliper. The trial patella was placed and the overall thickness was restored to 23 mm. A femoral  bone plug placed, the femoral lug holes drilled and the trials were removed. The surfaces were rinsed with pulse lavage and dried. Two batches of cement  were mixed. Appropriate sized implants were cemented in place, a trial tibial liner placed, and the knee held in full extension until cement hardened. The capsule was injected with a Ropivacaine . Excess cement was removed, and the actual tibial liner was snapped in place. No thumbs patella tracking was checked. No release was needed.

## 2019-11-01 VITALS
OXYGEN SATURATION: 95 % | SYSTOLIC BLOOD PRESSURE: 131 MMHG | DIASTOLIC BLOOD PRESSURE: 59 MMHG | HEART RATE: 74 BPM | HEIGHT: 62 IN | BODY MASS INDEX: 30.36 KG/M2 | TEMPERATURE: 98.9 F | RESPIRATION RATE: 20 BRPM | WEIGHT: 165 LBS

## 2019-11-01 PROBLEM — M17.12 ARTHRITIS OF LEFT KNEE: Status: ACTIVE | Noted: 2019-11-01

## 2019-11-01 LAB
ANION GAP SERPL CALCULATED.3IONS-SCNC: 17 MMOL/L (ref 7–19)
BUN BLDV-MCNC: 35 MG/DL (ref 8–23)
CALCIUM SERPL-MCNC: 9.4 MG/DL (ref 8.8–10.2)
CHLORIDE BLD-SCNC: 103 MMOL/L (ref 98–111)
CO2: 20 MMOL/L (ref 22–29)
CREAT SERPL-MCNC: 1.6 MG/DL (ref 0.5–0.9)
GFR NON-AFRICAN AMERICAN: 31
GLUCOSE BLD-MCNC: 156 MG/DL (ref 74–109)
HCT VFR BLD CALC: 32.6 % (ref 37–47)
HEMOGLOBIN: 10.5 G/DL (ref 12–16)
POTASSIUM REFLEX MAGNESIUM: 5 MMOL/L (ref 3.5–5)
SODIUM BLD-SCNC: 140 MMOL/L (ref 136–145)

## 2019-11-01 PROCEDURE — 97530 THERAPEUTIC ACTIVITIES: CPT

## 2019-11-01 PROCEDURE — 97116 GAIT TRAINING THERAPY: CPT

## 2019-11-01 PROCEDURE — 2580000003 HC RX 258: Performed by: ORTHOPAEDIC SURGERY

## 2019-11-01 PROCEDURE — 2580000003 HC RX 258: Performed by: PHYSICIAN ASSISTANT

## 2019-11-01 PROCEDURE — 96360 HYDRATION IV INFUSION INIT: CPT

## 2019-11-01 PROCEDURE — 85018 HEMOGLOBIN: CPT

## 2019-11-01 PROCEDURE — 80048 BASIC METABOLIC PNL TOTAL CA: CPT

## 2019-11-01 PROCEDURE — 96361 HYDRATE IV INFUSION ADD-ON: CPT

## 2019-11-01 PROCEDURE — 6370000000 HC RX 637 (ALT 250 FOR IP): Performed by: ORTHOPAEDIC SURGERY

## 2019-11-01 PROCEDURE — 85014 HEMATOCRIT: CPT

## 2019-11-01 PROCEDURE — G0378 HOSPITAL OBSERVATION PER HR: HCPCS

## 2019-11-01 PROCEDURE — 36415 COLL VENOUS BLD VENIPUNCTURE: CPT

## 2019-11-01 RX ORDER — 0.9 % SODIUM CHLORIDE 0.9 %
500 INTRAVENOUS SOLUTION INTRAVENOUS ONCE
Status: COMPLETED | OUTPATIENT
Start: 2019-11-01 | End: 2019-11-01

## 2019-11-01 RX ORDER — OXYCODONE HYDROCHLORIDE 5 MG/1
5 TABLET ORAL EVERY 4 HOURS PRN
Qty: 35 TABLET | Refills: 0 | Status: SHIPPED | OUTPATIENT
Start: 2019-11-01 | End: 2019-11-04

## 2019-11-01 RX ORDER — DOCUSATE SODIUM 100 MG/1
100 CAPSULE, LIQUID FILLED ORAL DAILY
Qty: 20 CAPSULE | Refills: 0 | Status: SHIPPED | OUTPATIENT
Start: 2019-11-01 | End: 2020-01-29 | Stop reason: ALTCHOICE

## 2019-11-01 RX ADMIN — SENNOSIDES AND DOCUSATE SODIUM 1 TABLET: 8.6; 5 TABLET ORAL at 09:38

## 2019-11-01 RX ADMIN — DOCUSATE SODIUM 100 MG: 100 CAPSULE, LIQUID FILLED ORAL at 09:36

## 2019-11-01 RX ADMIN — ASPIRIN 325 MG: 325 TABLET, DELAYED RELEASE ORAL at 09:35

## 2019-11-01 RX ADMIN — HYDROCHLOROTHIAZIDE 25 MG: 25 TABLET ORAL at 09:36

## 2019-11-01 RX ADMIN — Medication 1 CAPSULE: at 09:35

## 2019-11-01 RX ADMIN — SODIUM CHLORIDE 500 ML: 9 INJECTION, SOLUTION INTRAVENOUS at 08:00

## 2019-11-01 RX ADMIN — VENLAFAXINE HYDROCHLORIDE 150 MG: 75 CAPSULE, EXTENDED RELEASE ORAL at 09:38

## 2019-11-01 RX ADMIN — LISINOPRIL 5 MG: 5 TABLET ORAL at 09:35

## 2019-11-01 RX ADMIN — VITAMIN D, TAB 1000IU (100/BT) 1000 UNITS: 25 TAB at 09:36

## 2019-11-01 RX ADMIN — Medication 10 ML: at 09:36

## 2019-11-01 RX ADMIN — OXYCODONE HYDROCHLORIDE 10 MG: 5 TABLET ORAL at 06:34

## 2019-11-01 RX ADMIN — OXYCODONE HYDROCHLORIDE 10 MG: 5 TABLET ORAL at 10:26

## 2019-11-01 RX ADMIN — MULTIPLE VITAMINS W/ MINERALS TAB 1 TABLET: TAB at 09:35

## 2019-11-01 RX ADMIN — OXYCODONE HYDROCHLORIDE 10 MG: 5 TABLET ORAL at 02:33

## 2019-11-01 RX ADMIN — ACETAMINOPHEN 650 MG: 325 TABLET ORAL at 05:46

## 2019-11-01 RX ADMIN — AMLODIPINE BESYLATE 10 MG: 10 TABLET ORAL at 09:36

## 2019-11-01 ASSESSMENT — PAIN SCALES - GENERAL
PAINLEVEL_OUTOF10: 5
PAINLEVEL_OUTOF10: 7
PAINLEVEL_OUTOF10: 3
PAINLEVEL_OUTOF10: 5
PAINLEVEL_OUTOF10: 5
PAINLEVEL_OUTOF10: 7

## 2019-11-01 ASSESSMENT — PAIN DESCRIPTION - ORIENTATION
ORIENTATION: LEFT

## 2019-11-01 ASSESSMENT — PAIN DESCRIPTION - LOCATION
LOCATION: KNEE

## 2019-11-01 ASSESSMENT — PAIN - FUNCTIONAL ASSESSMENT
PAIN_FUNCTIONAL_ASSESSMENT: PREVENTS OR INTERFERES SOME ACTIVE ACTIVITIES AND ADLS
PAIN_FUNCTIONAL_ASSESSMENT: PREVENTS OR INTERFERES SOME ACTIVE ACTIVITIES AND ADLS

## 2019-11-01 ASSESSMENT — PAIN DESCRIPTION - PROGRESSION
CLINICAL_PROGRESSION: GRADUALLY WORSENING

## 2019-11-01 ASSESSMENT — PAIN DESCRIPTION - DESCRIPTORS
DESCRIPTORS: DISCOMFORT

## 2019-11-01 ASSESSMENT — PAIN DESCRIPTION - PAIN TYPE
TYPE: ACUTE PAIN;SURGICAL PAIN
TYPE: ACUTE PAIN;SURGICAL PAIN

## 2019-11-01 ASSESSMENT — PAIN DESCRIPTION - FREQUENCY
FREQUENCY: INTERMITTENT
FREQUENCY: INTERMITTENT

## 2019-11-01 ASSESSMENT — PAIN DESCRIPTION - ONSET
ONSET: ON-GOING
ONSET: ON-GOING

## 2019-11-01 NOTE — CARE COORDINATION
Home Health referral received. Per Tyler Pina Navigjames,  patient is going outpatient for PT. Will sign off.    Electronically signed by Santana Cruz RN on 11/1/19 at 11:03 AM

## 2019-11-01 NOTE — DISCHARGE SUMMARY
[facility] [address]  Dr. Gregor Reaves  Discharge Summary    The Ly Echevarria is a 70 y.o. female underwent total knee replacement procedure without complication. Ly Echevarria was admitted to the floor following her   recovery in the PACU.      Discharge Diagnosis   Left    Knee Replacement    Current Inpatient Medications    Current Facility-Administered Medications: amLODIPine (NORVASC) tablet 10 mg, 10 mg, Oral, Daily  lisinopril (PRINIVIL;ZESTRIL) tablet 5 mg, 5 mg, Oral, Daily  hydrochlorothiazide (HYDRODIURIL) tablet 25 mg, 25 mg, Oral, Daily  therapeutic multivitamin-minerals 1 tablet, 1 tablet, Oral, Daily  niacin extended release capsule 500 mg, 500 mg, Oral, Nightly  lactobacillus (CULTURELLE) capsule 1 capsule, 1 capsule, Oral, Daily  venlafaxine (EFFEXOR XR) extended release capsule 150 mg, 150 mg, Oral, Daily with breakfast  vitamin D (CHOLECALCIFEROL) tablet 1,000 Units, 1,000 Units, Oral, Daily  zolpidem (AMBIEN) tablet 5 mg, 5 mg, Oral, Nightly  0.9 % sodium chloride bolus, 500 mL, Intravenous, PRN  lactated ringers infusion, , Intravenous, Continuous  sodium chloride flush 0.9 % injection 10 mL, 10 mL, Intravenous, 2 times per day  sodium chloride flush 0.9 % injection 10 mL, 10 mL, Intravenous, PRN  acetaminophen (TYLENOL) tablet 650 mg, 650 mg, Oral, Q6H  oxyCODONE (ROXICODONE) immediate release tablet 5 mg, 5 mg, Oral, Q4H PRN **OR** oxyCODONE (ROXICODONE) immediate release tablet 10 mg, 10 mg, Oral, Q4H PRN  morphine injection 2 mg, 2 mg, Intravenous, Q1H PRN **OR** morphine injection 4 mg, 4 mg, Intravenous, Q1H PRN  docusate sodium (COLACE) capsule 100 mg, 100 mg, Oral, BID  sennosides-docusate sodium (SENOKOT-S) 8.6-50 MG tablet 1 tablet, 1 tablet, Oral, BID  magnesium hydroxide (MILK OF MAGNESIA) 400 MG/5ML suspension 30 mL, 30 mL, Oral, Daily PRN  ondansetron (ZOFRAN) injection 4 mg, 4 mg, Intravenous, Q6H PRN  aspirin EC tablet 325 mg, 325 mg, Oral, BID  ibuprofen (ADVIL;MOTRIN) tablet 400 mg, 400 mg, Oral, TID WC  0.9 % sodium chloride infusion, , Intravenous, Continuous    Post-operatively the patients diet was advanced as tolerated and their incision was checked on POD #1. The incision was clean, dry and intact with no signs of infection. The patient remained neurovascularly intact in the lower extremity and had intact pulses distally. Patients calf remained soft and showed no evidence of DVT. The patient was able to move their leg and ankle/foot without any problems post-operatively. Physical therapy and occupational therapy were consulted and began working with the patient post-operatively. The patient progressed with PT/OT as would be expected and continued to improve through their stay. The patients pain was initially controlled with IV medications but we were able to transition to oral pain medications soon after arrival to the floor and their pain remained under good control through their hospital stay. From a medical standpoint the patient remained stable and continued to have the medicine team follow throughout their stay. Acute postoperative blood loss anemia after joint replacement being monitored with daily hemoglobin/hematocrit. The patients dressing was changed/incison was checked on day of d/c. The patient will be discharged at this time to  Home   with their current diet restrictions and will continue to follow the total knee precautions outlined to them by us and PT/OT. Condition on Discharge: Stable    Plan  Followup at scheduled appointment time (1 month post-op). Patient was instructed on the use of pain medications, the signs and symptoms of infection, and was given our number to call should they have any questions or concerns following discharge.

## 2019-11-01 NOTE — PROGRESS NOTES
Subjective:     Post-Operative Day: 1 NO complaints     Objective:     Patient Vitals for the past 24 hrs:   BP Temp Temp src Pulse Resp SpO2 Height Weight   11/01/19 0256 121/67 99 °F (37.2 °C) Temporal 94 16 96 % -- --   10/31/19 2219 (!) 115/56 98.7 °F (37.1 °C) Temporal 81 16 93 % -- --   10/31/19 1852 (!) 156/77 99 °F (37.2 °C) Temporal 99 16 93 % -- --   10/31/19 1607 116/64 99.2 °F (37.3 °C) Temporal 93 18 96 % -- --   10/31/19 1441 136/77 97.8 °F (36.6 °C) Temporal 102 18 95 % -- --   10/31/19 1336 109/74 98 °F (36.7 °C) Temporal 97 18 93 % -- --   10/31/19 1128 -- -- -- -- 20 93 % -- --   10/31/19 1038 (!) 118/53 97.5 °F (36.4 °C) Temporal 73 18 93 % -- --   10/31/19 1030 (!) 118/48 -- -- 70 10 95 % -- --   10/31/19 1025 (!) 124/44 -- -- 70 10 94 % -- --   10/31/19 1020 (!) 118/48 96.9 °F (36.1 °C) Temporal 68 9 95 % -- --   10/31/19 1015 (!) 93/52 -- -- 68 13 95 % -- --   10/31/19 1010 (!) 121/43 -- -- 67 11 92 % -- --   10/31/19 1005 (!) 108/42 -- -- 66 9 94 % -- --   10/31/19 1000 (!) 109/45 -- -- 69 11 94 % -- --   10/31/19 0955 (!) 113/51 -- -- 69 10 94 % -- --   10/31/19 0950 (!) 113/51 -- -- 70 11 96 % -- --   10/31/19 0945 (!) 101/44 -- -- 69 12 95 % -- --   10/31/19 0940 (!) 105/46 -- -- 71 13 95 % -- --   10/31/19 0935 (!) 105/46 -- -- 72 11 96 % -- --   10/31/19 0931 (!) 100/41 96.9 °F (36.1 °C) Temporal 76 25 98 % -- --   10/31/19 0618 (!) 165/69 97.2 °F (36.2 °C) Temporal 81 18 94 % 5' 2\" (1.575 m) 165 lb (74.8 kg)       General: alert, appears stated age and cooperative   Wound: Dressing clean,dry and intact and mod swelling    Neurovascular: Exam normal   DVT Exam: No evidence of DVT seen on physical exam.         Data Review:  Recent Labs     11/01/19  0330   HGB 10.5*     Recent Labs     11/01/19  0330      K 5.0   CREATININE 1.6*   GLUCOSE 156*     No results for input(s): POCGLU in the last 72 hours.   No orders to display       Assessment:     Status Post left Total Knee

## 2019-11-01 NOTE — CONSULTS
Vital Signs   All pre-op and post op vitals reviewed           Physical Exam:  Constitutional: oriented to person, place, and time. appears well-developed. HEENT:   Head: Normocephalic and atraumatic. Eyes: Pupils are equal, round, and reactive to light. Neck: Neck supple. Cardiovascular: Regular rhythm and normal heart sounds. Pulmonary/Chest: Effort normal and breath sounds normal. CTAB  Abdominal: Soft. Bowel sounds are normal. He exhibits no distension. There is no tenderness. There is no rebound and no guarding. Musculoskeletal: Normal range of motion. no edema or tenderness. Post-op changes noted  Neurological:  alert and oriented to person, place, and time. normal reflexes. No focal deficits  Skin: Skin is warm and dry. No new rashes appreciated. Results Review:   I reviewed the patient's new imaging results and agree with the interpretation. Active Problems: Treatment recommendations    Primary osteoarthritis of left knee  Slow transit constipation--bowel regimen  GEOFF--see below  Essential hypertension-HCTZ, Lotensin, Norvasc  Carotid occlusive disease--previous carotid enterectomy  Postoperative blood loss anemia--stable, expected, being monitored with daily labs  GERD--antireflux measures      Acute kidney injury. 3 AM labs showing GFR down to 31. Blood pressure normal potential plans for discharge today. Recommendations are to give IV fluid bolus and recheck labs at noon  Discussed with patient if discharge today will need close follow-up with PCP MARTÍNEZ [place], [place]  She is unaware of any history of kidney disease preop labs reviewed with her as well. Advised DC daily use of NSAIDs  Early mobilization work with therapy  Incentive spirometry q. one hours while awake  Medically stable 73-year-old female postop day #1 will need to keep close monitor at this time.   Continue course of care monitor for changes  Recommendations otherwise as above    I discussed the patients findings and my recommendations with patient/family, staff and the Orthopaedic team.  Yoel Ours  11/01/19  7:17 AM      Chronic kidney disease- keep hydrated. Avoid nephrotoxic medications including NSAIDs  Follow-up with primary care provider 1-2 weeks to recheck creatinine. Medically stable for discharge home. Pt was seen for medical consult during the acute care setting, they will return to their primary care provider and have been instructed to follow up with them concerning abnormal lab/x-rays. We will be available for 30 days if return to ER, otherwise after that date they will go to Hospital ist service. All questions and concerns addressed prior to discharge. I have discussed the care of Lindsey Covington, including pertinent history and exam findings with the ARNP/PA. I have seen and examined the patient and the key elements of all parts of the encounter have been performed by me. I agree with the assessment and plan as outlined by the ARNP/PA. Please refer to my separate note for complete documentation.      Electronically signed by Mart Giles MD on 11/1/2019 at 8:21 AM

## 2019-11-14 ENCOUNTER — TELEPHONE (OUTPATIENT)
Dept: INPATIENT UNIT | Age: 79
End: 2019-11-14

## 2020-01-29 ENCOUNTER — HOSPITAL ENCOUNTER (OUTPATIENT)
Dept: PREADMISSION TESTING | Age: 80
Discharge: HOME | End: 2020-02-02
Payer: MEDICARE

## 2020-01-29 VITALS — BODY MASS INDEX: 30.36 KG/M2 | HEIGHT: 62 IN | WEIGHT: 165 LBS

## 2020-01-29 LAB
ABO/RH: NORMAL
ANION GAP SERPL CALCULATED.3IONS-SCNC: 17 MMOL/L (ref 7–19)
ANTIBODY SCREEN: NORMAL
APTT: 29.8 SEC (ref 26–36.2)
BACTERIA: ABNORMAL /HPF
BASOPHILS ABSOLUTE: 0.1 K/UL (ref 0–0.2)
BASOPHILS RELATIVE PERCENT: 0.7 % (ref 0–1)
BILIRUBIN URINE: NEGATIVE
BLOOD, URINE: ABNORMAL
BUN BLDV-MCNC: 19 MG/DL (ref 8–23)
CALCIUM SERPL-MCNC: 10.4 MG/DL (ref 8.8–10.2)
CHLORIDE BLD-SCNC: 100 MMOL/L (ref 98–111)
CLARITY: CLEAR
CO2: 23 MMOL/L (ref 22–29)
COLOR: YELLOW
CREAT SERPL-MCNC: 0.7 MG/DL (ref 0.5–0.9)
EOSINOPHILS ABSOLUTE: 0.1 K/UL (ref 0–0.6)
EOSINOPHILS RELATIVE PERCENT: 0.7 % (ref 0–5)
EPITHELIAL CELLS, UA: ABNORMAL /HPF
GFR NON-AFRICAN AMERICAN: >60
GLUCOSE BLD-MCNC: 83 MG/DL (ref 74–109)
GLUCOSE URINE: NEGATIVE MG/DL
HCT VFR BLD CALC: 44.5 % (ref 37–47)
HEMOGLOBIN: 13.9 G/DL (ref 12–16)
IMMATURE GRANULOCYTES #: 0 K/UL
INR BLD: 1 (ref 0.88–1.18)
KETONES, URINE: NEGATIVE MG/DL
LEUKOCYTE ESTERASE, URINE: NEGATIVE
LYMPHOCYTES ABSOLUTE: 1.9 K/UL (ref 1.1–4.5)
LYMPHOCYTES RELATIVE PERCENT: 25.6 % (ref 20–40)
MCH RBC QN AUTO: 28.2 PG (ref 27–31)
MCHC RBC AUTO-ENTMCNC: 31.2 G/DL (ref 33–37)
MCV RBC AUTO: 90.3 FL (ref 81–99)
MONOCYTES ABSOLUTE: 0.7 K/UL (ref 0–0.9)
MONOCYTES RELATIVE PERCENT: 9.1 % (ref 0–10)
NEUTROPHILS ABSOLUTE: 4.6 K/UL (ref 1.5–7.5)
NEUTROPHILS RELATIVE PERCENT: 63.8 % (ref 50–65)
NITRITE, URINE: NEGATIVE
PDW BLD-RTO: 12.9 % (ref 11.5–14.5)
PH UA: 5.5 (ref 5–8)
PLATELET # BLD: 263 K/UL (ref 130–400)
PMV BLD AUTO: 10.9 FL (ref 9.4–12.3)
POTASSIUM SERPL-SCNC: 3.6 MMOL/L (ref 3.5–5)
PROTEIN UA: 30 MG/DL
PROTHROMBIN TIME: 12.6 SEC (ref 12–14.6)
RBC # BLD: 4.93 M/UL (ref 4.2–5.4)
RBC UA: ABNORMAL /HPF (ref 0–2)
SODIUM BLD-SCNC: 140 MMOL/L (ref 136–145)
SPECIFIC GRAVITY UA: 1.02 (ref 1–1.03)
URINE REFLEX TO CULTURE: ABNORMAL
UROBILINOGEN, URINE: 0.2 E.U./DL
WBC # BLD: 7.3 K/UL (ref 4.8–10.8)
WBC UA: ABNORMAL /HPF (ref 0–5)

## 2020-01-29 PROCEDURE — 87077 CULTURE AEROBIC IDENTIFY: CPT

## 2020-01-29 PROCEDURE — 86900 BLOOD TYPING SEROLOGIC ABO: CPT

## 2020-01-29 PROCEDURE — 93010 ELECTROCARDIOGRAM REPORT: CPT | Performed by: INTERNAL MEDICINE

## 2020-01-29 PROCEDURE — 86901 BLOOD TYPING SEROLOGIC RH(D): CPT

## 2020-01-29 PROCEDURE — 85025 COMPLETE CBC W/AUTO DIFF WBC: CPT

## 2020-01-29 PROCEDURE — 86850 RBC ANTIBODY SCREEN: CPT

## 2020-01-29 PROCEDURE — 85730 THROMBOPLASTIN TIME PARTIAL: CPT

## 2020-01-29 PROCEDURE — 87086 URINE CULTURE/COLONY COUNT: CPT

## 2020-01-29 PROCEDURE — 93005 ELECTROCARDIOGRAM TRACING: CPT | Performed by: ORTHOPAEDIC SURGERY

## 2020-01-29 PROCEDURE — 87081 CULTURE SCREEN ONLY: CPT

## 2020-01-29 PROCEDURE — 80048 BASIC METABOLIC PNL TOTAL CA: CPT

## 2020-01-29 PROCEDURE — 81001 URINALYSIS AUTO W/SCOPE: CPT

## 2020-01-29 PROCEDURE — 87186 SC STD MICRODIL/AGAR DIL: CPT

## 2020-01-29 PROCEDURE — 85610 PROTHROMBIN TIME: CPT

## 2020-01-29 RX ORDER — ASPIRIN 325 MG
325 TABLET ORAL DAILY
Status: ON HOLD | COMMUNITY
End: 2020-02-12 | Stop reason: SDUPTHER

## 2020-01-29 NOTE — PROGRESS NOTES
Left message with pt's pcp while she was here in preop, for them to send her most recent labs. Pt states if they do not call back or send labs, just repeat if necessary.

## 2020-01-30 LAB
EKG P AXIS: 42 DEGREES
EKG P-R INTERVAL: 212 MS
EKG Q-T INTERVAL: 412 MS
EKG QRS DURATION: 88 MS
EKG QTC CALCULATION (BAZETT): 431 MS
EKG T AXIS: 61 DEGREES

## 2020-01-31 LAB — MRSA CULTURE ONLY: NORMAL

## 2020-02-01 LAB
ORGANISM: ABNORMAL
URINE CULTURE, ROUTINE: ABNORMAL
URINE CULTURE, ROUTINE: ABNORMAL

## 2020-02-11 ENCOUNTER — ANESTHESIA EVENT (OUTPATIENT)
Dept: OPERATING ROOM | Age: 80
End: 2020-02-11
Payer: MEDICARE

## 2020-02-11 ENCOUNTER — HOSPITAL ENCOUNTER (OUTPATIENT)
Age: 80
Setting detail: OBSERVATION
Discharge: HOME | End: 2020-02-12
Attending: ORTHOPAEDIC SURGERY | Admitting: ORTHOPAEDIC SURGERY
Payer: MEDICARE

## 2020-02-11 ENCOUNTER — ANESTHESIA (OUTPATIENT)
Dept: OPERATING ROOM | Age: 80
End: 2020-02-11
Payer: MEDICARE

## 2020-02-11 VITALS — DIASTOLIC BLOOD PRESSURE: 41 MMHG | TEMPERATURE: 97.6 F | OXYGEN SATURATION: 97 % | SYSTOLIC BLOOD PRESSURE: 78 MMHG

## 2020-02-11 PROBLEM — M17.11 PRIMARY OSTEOARTHRITIS OF RIGHT KNEE: Status: ACTIVE | Noted: 2020-02-11

## 2020-02-11 LAB
ABO/RH: NORMAL
ANTIBODY SCREEN: NORMAL

## 2020-02-11 PROCEDURE — 2580000003 HC RX 258: Performed by: NURSE ANESTHETIST, CERTIFIED REGISTERED

## 2020-02-11 PROCEDURE — 86850 RBC ANTIBODY SCREEN: CPT

## 2020-02-11 PROCEDURE — 6370000000 HC RX 637 (ALT 250 FOR IP): Performed by: ORTHOPAEDIC SURGERY

## 2020-02-11 PROCEDURE — 86901 BLOOD TYPING SEROLOGIC RH(D): CPT

## 2020-02-11 PROCEDURE — 6360000002 HC RX W HCPCS: Performed by: ANESTHESIOLOGY

## 2020-02-11 PROCEDURE — 6360000002 HC RX W HCPCS: Performed by: NURSE ANESTHETIST, CERTIFIED REGISTERED

## 2020-02-11 PROCEDURE — 2580000003 HC RX 258: Performed by: ORTHOPAEDIC SURGERY

## 2020-02-11 PROCEDURE — 7100000001 HC PACU RECOVERY - ADDTL 15 MIN: Performed by: ORTHOPAEDIC SURGERY

## 2020-02-11 PROCEDURE — 6360000002 HC RX W HCPCS: Performed by: ORTHOPAEDIC SURGERY

## 2020-02-11 PROCEDURE — 36415 COLL VENOUS BLD VENIPUNCTURE: CPT

## 2020-02-11 PROCEDURE — 2500000003 HC RX 250 WO HCPCS: Performed by: ORTHOPAEDIC SURGERY

## 2020-02-11 PROCEDURE — 2500000003 HC RX 250 WO HCPCS: Performed by: NURSE ANESTHETIST, CERTIFIED REGISTERED

## 2020-02-11 PROCEDURE — 3600000015 HC SURGERY LEVEL 5 ADDTL 15MIN: Performed by: ORTHOPAEDIC SURGERY

## 2020-02-11 PROCEDURE — G0378 HOSPITAL OBSERVATION PER HR: HCPCS

## 2020-02-11 PROCEDURE — 2709999900 HC NON-CHARGEABLE SUPPLY: Performed by: ORTHOPAEDIC SURGERY

## 2020-02-11 PROCEDURE — 97116 GAIT TRAINING THERAPY: CPT

## 2020-02-11 PROCEDURE — 3700000000 HC ANESTHESIA ATTENDED CARE: Performed by: ORTHOPAEDIC SURGERY

## 2020-02-11 PROCEDURE — 86900 BLOOD TYPING SEROLOGIC ABO: CPT

## 2020-02-11 PROCEDURE — C1713 ANCHOR/SCREW BN/BN,TIS/BN: HCPCS | Performed by: ORTHOPAEDIC SURGERY

## 2020-02-11 PROCEDURE — 64447 NJX AA&/STRD FEMORAL NRV IMG: CPT | Performed by: NURSE ANESTHETIST, CERTIFIED REGISTERED

## 2020-02-11 PROCEDURE — 2700000000 HC OXYGEN THERAPY PER DAY

## 2020-02-11 PROCEDURE — 3700000001 HC ADD 15 MINUTES (ANESTHESIA): Performed by: ORTHOPAEDIC SURGERY

## 2020-02-11 PROCEDURE — 7100000000 HC PACU RECOVERY - FIRST 15 MIN: Performed by: ORTHOPAEDIC SURGERY

## 2020-02-11 PROCEDURE — 97161 PT EVAL LOW COMPLEX 20 MIN: CPT

## 2020-02-11 PROCEDURE — 3600000005 HC SURGERY LEVEL 5 BASE: Performed by: ORTHOPAEDIC SURGERY

## 2020-02-11 PROCEDURE — C1776 JOINT DEVICE (IMPLANTABLE): HCPCS | Performed by: ORTHOPAEDIC SURGERY

## 2020-02-11 DEVICE — SURFACE ARTC TIB C-D FEM 3-5 THK12MM RT VIVACIT-E CONSTRN: Type: IMPLANTABLE DEVICE | Site: TIBIA | Status: FUNCTIONAL

## 2020-02-11 DEVICE — IMPL KNEE FEM PSN PS CMT STD SZ4 RT: Type: IMPLANTABLE DEVICE | Site: FEMUR | Status: FUNCTIONAL

## 2020-02-11 DEVICE — IMPL KNEE PERSONA POLY PATELLA 26MM: Type: IMPLANTABLE DEVICE | Site: PATELLA | Status: FUNCTIONAL

## 2020-02-11 DEVICE — EXTENSION STEM L30MM DIA14MM KNEE TAPR CEM PERSONA: Type: IMPLANTABLE DEVICE | Site: TIBIA | Status: FUNCTIONAL

## 2020-02-11 DEVICE — CEMENT BNE 40GM HI VISC RADPQ FOR REV SURG: Type: IMPLANTABLE DEVICE | Site: KNEE | Status: FUNCTIONAL

## 2020-02-11 DEVICE — PSN TIB STM 5 DEG SZ C R: Type: IMPLANTABLE DEVICE | Site: TIBIA | Status: FUNCTIONAL

## 2020-02-11 RX ORDER — PROPOFOL 10 MG/ML
INJECTION, EMULSION INTRAVENOUS PRN
Status: DISCONTINUED | OUTPATIENT
Start: 2020-02-11 | End: 2020-02-11 | Stop reason: SDUPTHER

## 2020-02-11 RX ORDER — VITAMIN B COMPLEX
1000 TABLET ORAL DAILY
Status: DISCONTINUED | OUTPATIENT
Start: 2020-02-11 | End: 2020-02-12 | Stop reason: HOSPADM

## 2020-02-11 RX ORDER — SODIUM CHLORIDE 0.9 % (FLUSH) 0.9 %
10 SYRINGE (ML) INJECTION PRN
Status: DISCONTINUED | OUTPATIENT
Start: 2020-02-11 | End: 2020-02-11 | Stop reason: HOSPADM

## 2020-02-11 RX ORDER — SODIUM CHLORIDE, SODIUM LACTATE, POTASSIUM CHLORIDE, CALCIUM CHLORIDE 600; 310; 30; 20 MG/100ML; MG/100ML; MG/100ML; MG/100ML
INJECTION, SOLUTION INTRAVENOUS CONTINUOUS
Status: DISCONTINUED | OUTPATIENT
Start: 2020-02-11 | End: 2020-02-11

## 2020-02-11 RX ORDER — PROMETHAZINE HYDROCHLORIDE 25 MG/ML
6.25 INJECTION, SOLUTION INTRAMUSCULAR; INTRAVENOUS
Status: DISCONTINUED | OUTPATIENT
Start: 2020-02-11 | End: 2020-02-11 | Stop reason: HOSPADM

## 2020-02-11 RX ORDER — ROPIVACAINE HYDROCHLORIDE 2 MG/ML
INJECTION, SOLUTION EPIDURAL; INFILTRATION; PERINEURAL PRN
Status: DISCONTINUED | OUTPATIENT
Start: 2020-02-11 | End: 2020-02-11 | Stop reason: ALTCHOICE

## 2020-02-11 RX ORDER — IBUPROFEN 400 MG/1
400 TABLET ORAL
Status: DISCONTINUED | OUTPATIENT
Start: 2020-02-11 | End: 2020-02-12 | Stop reason: HOSPADM

## 2020-02-11 RX ORDER — 0.9 % SODIUM CHLORIDE 0.9 %
500 INTRAVENOUS SOLUTION INTRAVENOUS PRN
Status: DISCONTINUED | OUTPATIENT
Start: 2020-02-11 | End: 2020-02-12 | Stop reason: HOSPADM

## 2020-02-11 RX ORDER — OXYCODONE HYDROCHLORIDE 5 MG/1
5 TABLET ORAL EVERY 4 HOURS PRN
Status: DISCONTINUED | OUTPATIENT
Start: 2020-02-11 | End: 2020-02-12 | Stop reason: HOSPADM

## 2020-02-11 RX ORDER — DOCUSATE SODIUM 100 MG/1
100 CAPSULE, LIQUID FILLED ORAL 2 TIMES DAILY
Status: DISCONTINUED | OUTPATIENT
Start: 2020-02-11 | End: 2020-02-12 | Stop reason: HOSPADM

## 2020-02-11 RX ORDER — ENALAPRILAT 2.5 MG/2ML
1.25 INJECTION INTRAVENOUS
Status: DISCONTINUED | OUTPATIENT
Start: 2020-02-11 | End: 2020-02-11 | Stop reason: HOSPADM

## 2020-02-11 RX ORDER — LIDOCAINE HYDROCHLORIDE 10 MG/ML
INJECTION, SOLUTION EPIDURAL; INFILTRATION; INTRACAUDAL; PERINEURAL PRN
Status: DISCONTINUED | OUTPATIENT
Start: 2020-02-11 | End: 2020-02-11 | Stop reason: SDUPTHER

## 2020-02-11 RX ORDER — TRANEXAMIC ACID 650 1/1
1950 TABLET ORAL
Status: COMPLETED | OUTPATIENT
Start: 2020-02-11 | End: 2020-02-11

## 2020-02-11 RX ORDER — MORPHINE SULFATE 4 MG/ML
4 INJECTION, SOLUTION INTRAMUSCULAR; INTRAVENOUS EVERY 5 MIN PRN
Status: DISCONTINUED | OUTPATIENT
Start: 2020-02-11 | End: 2020-02-11 | Stop reason: HOSPADM

## 2020-02-11 RX ORDER — FENTANYL CITRATE 50 UG/ML
25 INJECTION, SOLUTION INTRAMUSCULAR; INTRAVENOUS
Status: DISCONTINUED | OUTPATIENT
Start: 2020-02-11 | End: 2020-02-11 | Stop reason: HOSPADM

## 2020-02-11 RX ORDER — LIDOCAINE HYDROCHLORIDE 10 MG/ML
1 INJECTION, SOLUTION EPIDURAL; INFILTRATION; INTRACAUDAL; PERINEURAL
Status: DISCONTINUED | OUTPATIENT
Start: 2020-02-11 | End: 2020-02-11 | Stop reason: HOSPADM

## 2020-02-11 RX ORDER — M-VIT,TX,IRON,MINS/CALC/FOLIC 27MG-0.4MG
1 TABLET ORAL DAILY
Status: DISCONTINUED | OUTPATIENT
Start: 2020-02-11 | End: 2020-02-12 | Stop reason: HOSPADM

## 2020-02-11 RX ORDER — HYDRALAZINE HYDROCHLORIDE 20 MG/ML
5 INJECTION INTRAMUSCULAR; INTRAVENOUS EVERY 10 MIN PRN
Status: DISCONTINUED | OUTPATIENT
Start: 2020-02-11 | End: 2020-02-11 | Stop reason: HOSPADM

## 2020-02-11 RX ORDER — LACTOBACILLUS RHAMNOSUS GG 10B CELL
1 CAPSULE ORAL DAILY
Status: DISCONTINUED | OUTPATIENT
Start: 2020-02-11 | End: 2020-02-12 | Stop reason: HOSPADM

## 2020-02-11 RX ORDER — VENLAFAXINE HYDROCHLORIDE 75 MG/1
150 CAPSULE, EXTENDED RELEASE ORAL
Status: DISCONTINUED | OUTPATIENT
Start: 2020-02-12 | End: 2020-02-12 | Stop reason: HOSPADM

## 2020-02-11 RX ORDER — MORPHINE SULFATE 4 MG/ML
2 INJECTION, SOLUTION INTRAMUSCULAR; INTRAVENOUS EVERY 5 MIN PRN
Status: DISCONTINUED | OUTPATIENT
Start: 2020-02-11 | End: 2020-02-11 | Stop reason: HOSPADM

## 2020-02-11 RX ORDER — SODIUM CHLORIDE, SODIUM LACTATE, POTASSIUM CHLORIDE, CALCIUM CHLORIDE 600; 310; 30; 20 MG/100ML; MG/100ML; MG/100ML; MG/100ML
INJECTION, SOLUTION INTRAVENOUS CONTINUOUS
Status: DISCONTINUED | OUTPATIENT
Start: 2020-02-11 | End: 2020-02-12 | Stop reason: HOSPADM

## 2020-02-11 RX ORDER — ASPIRIN 81 MG/1
81 TABLET ORAL 2 TIMES DAILY
Status: DISCONTINUED | OUTPATIENT
Start: 2020-02-11 | End: 2020-02-11

## 2020-02-11 RX ORDER — ONDANSETRON 2 MG/ML
INJECTION INTRAMUSCULAR; INTRAVENOUS PRN
Status: DISCONTINUED | OUTPATIENT
Start: 2020-02-11 | End: 2020-02-11 | Stop reason: SDUPTHER

## 2020-02-11 RX ORDER — LABETALOL 20 MG/4 ML (5 MG/ML) INTRAVENOUS SYRINGE
5 EVERY 10 MIN PRN
Status: DISCONTINUED | OUTPATIENT
Start: 2020-02-11 | End: 2020-02-11 | Stop reason: HOSPADM

## 2020-02-11 RX ORDER — FENTANYL CITRATE 50 UG/ML
50 INJECTION, SOLUTION INTRAMUSCULAR; INTRAVENOUS
Status: DISCONTINUED | OUTPATIENT
Start: 2020-02-11 | End: 2020-02-11 | Stop reason: HOSPADM

## 2020-02-11 RX ORDER — AMLODIPINE BESYLATE 10 MG/1
10 TABLET ORAL DAILY
Status: DISCONTINUED | OUTPATIENT
Start: 2020-02-12 | End: 2020-02-12 | Stop reason: HOSPADM

## 2020-02-11 RX ORDER — SODIUM CHLORIDE, SODIUM LACTATE, POTASSIUM CHLORIDE, CALCIUM CHLORIDE 600; 310; 30; 20 MG/100ML; MG/100ML; MG/100ML; MG/100ML
INJECTION, SOLUTION INTRAVENOUS CONTINUOUS PRN
Status: DISCONTINUED | OUTPATIENT
Start: 2020-02-11 | End: 2020-02-11 | Stop reason: SDUPTHER

## 2020-02-11 RX ORDER — ACETAMINOPHEN 500 MG
500 TABLET ORAL EVERY 6 HOURS PRN
Status: DISCONTINUED | OUTPATIENT
Start: 2020-02-11 | End: 2020-02-12 | Stop reason: HOSPADM

## 2020-02-11 RX ORDER — ZOLPIDEM TARTRATE 5 MG/1
5 TABLET ORAL NIGHTLY
Status: DISCONTINUED | OUTPATIENT
Start: 2020-02-11 | End: 2020-02-12 | Stop reason: HOSPADM

## 2020-02-11 RX ORDER — EPHEDRINE SULFATE 50 MG/ML
INJECTION, SOLUTION INTRAVENOUS PRN
Status: DISCONTINUED | OUTPATIENT
Start: 2020-02-11 | End: 2020-02-11 | Stop reason: SDUPTHER

## 2020-02-11 RX ORDER — MIDAZOLAM HYDROCHLORIDE 1 MG/ML
2 INJECTION INTRAMUSCULAR; INTRAVENOUS ONCE
Status: COMPLETED | OUTPATIENT
Start: 2020-02-11 | End: 2020-02-11

## 2020-02-11 RX ORDER — OXYCODONE HYDROCHLORIDE 5 MG/1
10 TABLET ORAL EVERY 4 HOURS PRN
Status: DISCONTINUED | OUTPATIENT
Start: 2020-02-11 | End: 2020-02-12 | Stop reason: HOSPADM

## 2020-02-11 RX ORDER — MORPHINE SULFATE 4 MG/ML
2 INJECTION, SOLUTION INTRAMUSCULAR; INTRAVENOUS
Status: DISCONTINUED | OUTPATIENT
Start: 2020-02-11 | End: 2020-02-12 | Stop reason: HOSPADM

## 2020-02-11 RX ORDER — ACETAMINOPHEN 325 MG/1
650 TABLET ORAL EVERY 6 HOURS
Status: DISCONTINUED | OUTPATIENT
Start: 2020-02-11 | End: 2020-02-12 | Stop reason: HOSPADM

## 2020-02-11 RX ORDER — MEPERIDINE HYDROCHLORIDE 50 MG/ML
12.5 INJECTION INTRAMUSCULAR; INTRAVENOUS; SUBCUTANEOUS EVERY 5 MIN PRN
Status: DISCONTINUED | OUTPATIENT
Start: 2020-02-11 | End: 2020-02-11 | Stop reason: HOSPADM

## 2020-02-11 RX ORDER — CYCLOBENZAPRINE HCL 10 MG
10 TABLET ORAL 3 TIMES DAILY PRN
Status: DISCONTINUED | OUTPATIENT
Start: 2020-02-11 | End: 2020-02-12 | Stop reason: HOSPADM

## 2020-02-11 RX ORDER — CELECOXIB 100 MG/1
100 CAPSULE ORAL ONCE
Status: COMPLETED | OUTPATIENT
Start: 2020-02-11 | End: 2020-02-11

## 2020-02-11 RX ORDER — HYDROCHLOROTHIAZIDE 25 MG/1
25 TABLET ORAL DAILY
Status: DISCONTINUED | OUTPATIENT
Start: 2020-02-12 | End: 2020-02-12 | Stop reason: HOSPADM

## 2020-02-11 RX ORDER — METOCLOPRAMIDE HYDROCHLORIDE 5 MG/ML
10 INJECTION INTRAMUSCULAR; INTRAVENOUS
Status: DISCONTINUED | OUTPATIENT
Start: 2020-02-11 | End: 2020-02-11 | Stop reason: HOSPADM

## 2020-02-11 RX ORDER — NICOTINE 14MG/24HR
250 PATCH, TRANSDERMAL 24 HOURS TRANSDERMAL DAILY
Status: DISCONTINUED | OUTPATIENT
Start: 2020-02-11 | End: 2020-02-11

## 2020-02-11 RX ORDER — ACETAMINOPHEN 500 MG
1000 TABLET ORAL ONCE
Status: COMPLETED | OUTPATIENT
Start: 2020-02-11 | End: 2020-02-11

## 2020-02-11 RX ORDER — SODIUM CHLORIDE 0.9 % (FLUSH) 0.9 %
10 SYRINGE (ML) INJECTION PRN
Status: DISCONTINUED | OUTPATIENT
Start: 2020-02-11 | End: 2020-02-12 | Stop reason: HOSPADM

## 2020-02-11 RX ORDER — DIPHENHYDRAMINE HYDROCHLORIDE 50 MG/ML
12.5 INJECTION INTRAMUSCULAR; INTRAVENOUS
Status: DISCONTINUED | OUTPATIENT
Start: 2020-02-11 | End: 2020-02-11 | Stop reason: HOSPADM

## 2020-02-11 RX ORDER — SODIUM CHLORIDE 9 MG/ML
INJECTION, SOLUTION INTRAVENOUS CONTINUOUS
Status: DISCONTINUED | OUTPATIENT
Start: 2020-02-11 | End: 2020-02-11

## 2020-02-11 RX ORDER — SODIUM CHLORIDE 0.9 % (FLUSH) 0.9 %
10 SYRINGE (ML) INJECTION EVERY 12 HOURS SCHEDULED
Status: DISCONTINUED | OUTPATIENT
Start: 2020-02-11 | End: 2020-02-11 | Stop reason: HOSPADM

## 2020-02-11 RX ORDER — VANCOMYCIN HYDROCHLORIDE 1 G/20ML
INJECTION, POWDER, LYOPHILIZED, FOR SOLUTION INTRAVENOUS PRN
Status: DISCONTINUED | OUTPATIENT
Start: 2020-02-11 | End: 2020-02-11 | Stop reason: SDUPTHER

## 2020-02-11 RX ORDER — ONDANSETRON 2 MG/ML
4 INJECTION INTRAMUSCULAR; INTRAVENOUS EVERY 6 HOURS PRN
Status: DISCONTINUED | OUTPATIENT
Start: 2020-02-11 | End: 2020-02-12 | Stop reason: HOSPADM

## 2020-02-11 RX ORDER — PROPOFOL 10 MG/ML
INJECTION, EMULSION INTRAVENOUS CONTINUOUS PRN
Status: DISCONTINUED | OUTPATIENT
Start: 2020-02-11 | End: 2020-02-11 | Stop reason: SDUPTHER

## 2020-02-11 RX ORDER — MIDAZOLAM HYDROCHLORIDE 1 MG/ML
2 INJECTION INTRAMUSCULAR; INTRAVENOUS
Status: DISCONTINUED | OUTPATIENT
Start: 2020-02-11 | End: 2020-02-11 | Stop reason: HOSPADM

## 2020-02-11 RX ORDER — OXYCODONE HCL 10 MG/1
10 TABLET, FILM COATED, EXTENDED RELEASE ORAL
Status: COMPLETED | OUTPATIENT
Start: 2020-02-11 | End: 2020-02-11

## 2020-02-11 RX ORDER — ROPIVACAINE HYDROCHLORIDE 5 MG/ML
INJECTION, SOLUTION EPIDURAL; INFILTRATION; PERINEURAL
Status: COMPLETED | OUTPATIENT
Start: 2020-02-11 | End: 2020-02-11

## 2020-02-11 RX ORDER — MORPHINE SULFATE 4 MG/ML
4 INJECTION, SOLUTION INTRAMUSCULAR; INTRAVENOUS
Status: DISCONTINUED | OUTPATIENT
Start: 2020-02-11 | End: 2020-02-12 | Stop reason: HOSPADM

## 2020-02-11 RX ORDER — SODIUM CHLORIDE 0.9 % (FLUSH) 0.9 %
10 SYRINGE (ML) INJECTION EVERY 12 HOURS SCHEDULED
Status: DISCONTINUED | OUTPATIENT
Start: 2020-02-11 | End: 2020-02-12 | Stop reason: HOSPADM

## 2020-02-11 RX ORDER — SENNA AND DOCUSATE SODIUM 50; 8.6 MG/1; MG/1
1 TABLET, FILM COATED ORAL 2 TIMES DAILY
Status: DISCONTINUED | OUTPATIENT
Start: 2020-02-11 | End: 2020-02-12 | Stop reason: HOSPADM

## 2020-02-11 RX ADMIN — ROPIVACAINE HYDROCHLORIDE 20 ML: 5 INJECTION, SOLUTION EPIDURAL; INFILTRATION; PERINEURAL at 06:43

## 2020-02-11 RX ADMIN — MIDAZOLAM 2 MG: 1 INJECTION INTRAMUSCULAR; INTRAVENOUS at 06:41

## 2020-02-11 RX ADMIN — ASPIRIN 325 MG: 325 TABLET, COATED ORAL at 17:07

## 2020-02-11 RX ADMIN — Medication 2 G: at 23:53

## 2020-02-11 RX ADMIN — VITAMIN D, TAB 1000IU (100/BT) 1000 UNITS: 25 TAB at 11:07

## 2020-02-11 RX ADMIN — SODIUM CHLORIDE, POTASSIUM CHLORIDE, SODIUM LACTATE AND CALCIUM CHLORIDE: 600; 310; 30; 20 INJECTION, SOLUTION INTRAVENOUS at 15:31

## 2020-02-11 RX ADMIN — OXYCODONE 10 MG: 5 TABLET ORAL at 14:40

## 2020-02-11 RX ADMIN — ACETAMINOPHEN 650 MG: 325 TABLET ORAL at 11:11

## 2020-02-11 RX ADMIN — PROPOFOL 50 MG: 10 INJECTION, EMULSION INTRAVENOUS at 07:16

## 2020-02-11 RX ADMIN — VANCOMYCIN HYDROCHLORIDE 1000 MG: 1 INJECTION, POWDER, LYOPHILIZED, FOR SOLUTION INTRAVENOUS at 07:22

## 2020-02-11 RX ADMIN — SODIUM CHLORIDE, POTASSIUM CHLORIDE, SODIUM LACTATE AND CALCIUM CHLORIDE: 600; 310; 30; 20 INJECTION, SOLUTION INTRAVENOUS at 06:01

## 2020-02-11 RX ADMIN — ONDANSETRON HYDROCHLORIDE 4 MG: 2 INJECTION, SOLUTION INTRAMUSCULAR; INTRAVENOUS at 06:58

## 2020-02-11 RX ADMIN — ZOLPIDEM TARTRATE 5 MG: 5 TABLET ORAL at 20:38

## 2020-02-11 RX ADMIN — Medication 1 CAPSULE: at 11:07

## 2020-02-11 RX ADMIN — CYCLOBENZAPRINE 10 MG: 10 TABLET, FILM COATED ORAL at 17:37

## 2020-02-11 RX ADMIN — ACETAMINOPHEN 650 MG: 325 TABLET ORAL at 17:07

## 2020-02-11 RX ADMIN — OXYCODONE 10 MG: 5 TABLET ORAL at 23:18

## 2020-02-11 RX ADMIN — OXYCODONE 5 MG: 5 TABLET ORAL at 11:07

## 2020-02-11 RX ADMIN — SODIUM CHLORIDE, PRESERVATIVE FREE 10 ML: 5 INJECTION INTRAVENOUS at 20:39

## 2020-02-11 RX ADMIN — OXYCODONE HYDROCHLORIDE 10 MG: 10 TABLET, FILM COATED, EXTENDED RELEASE ORAL at 06:01

## 2020-02-11 RX ADMIN — EPHEDRINE SULFATE 5 MG: 50 INJECTION INTRAMUSCULAR; INTRAVENOUS; SUBCUTANEOUS at 07:56

## 2020-02-11 RX ADMIN — CELECOXIB 100 MG: 100 CAPSULE ORAL at 06:01

## 2020-02-11 RX ADMIN — SODIUM CHLORIDE, SODIUM LACTATE, POTASSIUM CHLORIDE, AND CALCIUM CHLORIDE: 600; 310; 30; 20 INJECTION, SOLUTION INTRAVENOUS at 07:00

## 2020-02-11 RX ADMIN — SENNOSIDES AND DOCUSATE SODIUM 1 TABLET: 8.6; 5 TABLET ORAL at 11:07

## 2020-02-11 RX ADMIN — ACETAMINOPHEN 650 MG: 325 TABLET ORAL at 23:52

## 2020-02-11 RX ADMIN — LIDOCAINE HYDROCHLORIDE 1000 MG: 10 INJECTION, SOLUTION EPIDURAL; INFILTRATION; INTRACAUDAL; PERINEURAL at 07:29

## 2020-02-11 RX ADMIN — Medication 2 G: at 15:33

## 2020-02-11 RX ADMIN — TRANEXAMIC ACID 1950 MG: 650 TABLET ORAL at 06:01

## 2020-02-11 RX ADMIN — ACETAMINOPHEN 1000 MG: 500 TABLET, FILM COATED ORAL at 06:01

## 2020-02-11 RX ADMIN — SENNOSIDES AND DOCUSATE SODIUM 1 TABLET: 8.6; 5 TABLET ORAL at 20:38

## 2020-02-11 RX ADMIN — MULTIPLE VITAMINS W/ MINERALS TAB 1 TABLET: TAB at 11:07

## 2020-02-11 RX ADMIN — DOCUSATE SODIUM 100 MG: 100 CAPSULE, LIQUID FILLED ORAL at 20:38

## 2020-02-11 RX ADMIN — Medication 2 G: at 07:07

## 2020-02-11 RX ADMIN — ASPIRIN 325 MG: 325 TABLET, COATED ORAL at 11:06

## 2020-02-11 RX ADMIN — OXYCODONE 10 MG: 5 TABLET ORAL at 19:19

## 2020-02-11 RX ADMIN — MORPHINE SULFATE 4 MG: 4 INJECTION, SOLUTION INTRAMUSCULAR; INTRAVENOUS at 20:42

## 2020-02-11 RX ADMIN — PROPOFOL 100 MCG/KG/MIN: 10 INJECTION, EMULSION INTRAVENOUS at 07:16

## 2020-02-11 RX ADMIN — MORPHINE SULFATE 2 MG: 4 INJECTION, SOLUTION INTRAMUSCULAR; INTRAVENOUS at 15:57

## 2020-02-11 RX ADMIN — DOCUSATE SODIUM 100 MG: 100 CAPSULE, LIQUID FILLED ORAL at 11:06

## 2020-02-11 RX ADMIN — EPHEDRINE SULFATE 10 MG: 50 INJECTION INTRAMUSCULAR; INTRAVENOUS; SUBCUTANEOUS at 08:29

## 2020-02-11 ASSESSMENT — PAIN SCALES - GENERAL
PAINLEVEL_OUTOF10: 7
PAINLEVEL_OUTOF10: 8
PAINLEVEL_OUTOF10: 5
PAINLEVEL_OUTOF10: 7
PAINLEVEL_OUTOF10: 8
PAINLEVEL_OUTOF10: 8
PAINLEVEL_OUTOF10: 4
PAINLEVEL_OUTOF10: 0

## 2020-02-11 ASSESSMENT — PAIN DESCRIPTION - LOCATION
LOCATION: KNEE

## 2020-02-11 ASSESSMENT — PAIN DESCRIPTION - PAIN TYPE
TYPE: ACUTE PAIN
TYPE: SURGICAL PAIN
TYPE: SURGICAL PAIN

## 2020-02-11 ASSESSMENT — PAIN DESCRIPTION - PROGRESSION
CLINICAL_PROGRESSION: GRADUALLY WORSENING

## 2020-02-11 ASSESSMENT — PAIN DESCRIPTION - DESCRIPTORS: DESCRIPTORS: THROBBING;SPASM;SHOOTING

## 2020-02-11 ASSESSMENT — PAIN DESCRIPTION - ORIENTATION
ORIENTATION: RIGHT

## 2020-02-11 ASSESSMENT — PAIN - FUNCTIONAL ASSESSMENT
PAIN_FUNCTIONAL_ASSESSMENT: PREVENTS OR INTERFERES SOME ACTIVE ACTIVITIES AND ADLS
PAIN_FUNCTIONAL_ASSESSMENT: PREVENTS OR INTERFERES SOME ACTIVE ACTIVITIES AND ADLS
PAIN_FUNCTIONAL_ASSESSMENT: PREVENTS OR INTERFERES WITH MANY ACTIVE NOT PASSIVE ACTIVITIES

## 2020-02-11 ASSESSMENT — ENCOUNTER SYMPTOMS: SHORTNESS OF BREATH: 0

## 2020-02-11 ASSESSMENT — PAIN DESCRIPTION - FREQUENCY
FREQUENCY: INTERMITTENT

## 2020-02-11 ASSESSMENT — LIFESTYLE VARIABLES: SMOKING_STATUS: 0

## 2020-02-11 NOTE — ANESTHESIA PROCEDURE NOTES
Peripheral Block    Patient location during procedure: holding area  Start time: 2/11/2020 6:43 AM  End time: 2/11/2020 6:43 AM  Staffing  Anesthesiologist: Vick Malave DO  Performed: anesthesiologist   Preanesthetic Checklist  Completed: patient identified, site marked, surgical consent, pre-op evaluation, timeout performed, IV checked, risks and benefits discussed, monitors and equipment checked, anesthesia consent given, oxygen available and patient being monitored  Peripheral Block  Patient position: supine  Prep: ChloraPrep  Patient monitoring: cardiac monitor, continuous pulse ox, frequent blood pressure checks and IV access  Block type: Femoral  Laterality: right  Injection technique: single-shot  Procedures: ultrasound guided  Local infiltration: ropivacaine  Adductor canal  Provider prep: mask and sterile gloves  Local infiltration: ropivacaine  Needle  Needle type: short-bevel   Needle gauge: 20 G  Needle length: 10 cm  Needle localization: ultrasound guidance  Assessment  Injection assessment: negative aspiration for heme, no paresthesia on injection and local visualized surrounding nerve on ultrasound  Paresthesia pain: none  Slow fractionated injection: yes  Hemodynamics: stable  Additional Notes  Needle was introduced in proximal third of thigh in an  celine-medial to posterior direction. The needle was visualized \" in- plane\" under ultrasound guidance to access the adductor canal in a sub-sartorial fashion. The femoral artery was avoided and 20 cc of 0.5% ropivacaine  was injected and surrounded the nerve plexus. The plexus appeared anatomically normal, no obvious pathology was noted.  A permanent image was obtained   Reason for block: post-op pain management

## 2020-02-11 NOTE — OP NOTE
supine position on the operating table. Anesthesia as specified above was placed. An antiobiotic was given IV. The unoperated extremities were well padded. A tourniquet was placed around the proximal thigh. The lower extremity was prepped with chlorhexidene/alcohol and draped sterilely using ioband barriers. A time out was performed. An anterior knee incision was made and fasciocutaneous flaps were developed. A mini midvastus approach was made and the patella subluxed. The prepatellar fat pad, ACL, and the anterior horns of the menisci were excised. A starter drill was placed down the femoral shaft 1 cm anterior to the PCL origin. An alignment emmanuel was placed down the femoral shaft. The distal femoral cutting guide, set at 5 degrees of valgus was then placed over the alignment emmanuel, and pinned perpendicular to the AP axis. The cutting block was then set to remove an extra 2 mm of distal femur and the distal cut made. The medial bone cut thickness was 12 mm. Hohmans and a PCL retractor were placed around the tibia. The external alignment guide set to cut 10 mm off the intact side at 7° degrees posterior slope was pinned in place. I stepped back and verified that the guide followed the anterior cortex of the tibia to the ankle. The tibial cut was made. Its thickness was 10 mm. The tibial fragment and osteophytes were removed. Posterior meniscal horns were resected. The extension gap was satisfactory. The epicondylar and AP femoral axes were marked with electrocautery. Femoral trials were laid on the distal femur to estimate the appropriate size. The femoral sizer, with posterior paddles set at 3 degrees of external rotation, and an anterior stylus was placed. The sizer reading matched the size predicted by the trial.   The pinholes were drilled for the 4 in 1 femoral cutting block.   This block was impacted on the distal femur and sat flush with the proper rotation relative to the AP and was needed. Hemostasis was achieved with electrocautery. The wound was copiously irrigated with antibiotic irrigation. The capsule was closed with interrupted #2 vicryl. Subcutaneous tissue was closed with running 2-0 vicryl. Skin was closed with 3-0 vicryl and Prineo. A sterile dressing was applied. The patient was awakened, extubated and transferred to the recovery room in stable condition.             PLAN:  Full weight bearing, ROM, dvt prophylaxis      Electronically signed by Freddie Jaimes MD on 2/11/2020 at 8:27 AM

## 2020-02-11 NOTE — H&P
[facility] ([facility]) Pre-Operative History and Physical    Patient Name: Carlos Hester  : 1940        Chief Complaint:  right knee pain  History of Present Illness: This patient has had ongoing pain for several weeks/months that has been unresponsive to conservative care which has included injection, therapy, activity modification and presents now for surgery. Past Medical History:       Diagnosis Date    Arthritis     Cancer ([address]     melanoma    Hypertension     Knee pain     Retention of urine     surgery before last; last total knee was better     Past Surgical History:       Procedure Laterality Date    CAROTID ENDARTERECTOMY Left     [address]    CARPAL TUNNEL RELEASE Left     HYSTERECTOMY      JOINT REPLACEMENT      ROTATOR CUFF REPAIR Bilateral     TOTAL KNEE ARTHROPLASTY Left 10/31/2019    LEFT TOTAL KNEE REPLACEMENT performed by Princess Osman MD at [address]         Medications:   Prior to Admission medications    Medication Sig Start Date End Date Taking? Authorizing Provider   aspirin 325 MG tablet Take 325 mg by mouth daily   Yes Historical Provider, MD   hydrochlorothiazide (HYDRODIURIL) 25 MG tablet Take 25 mg by mouth daily   Yes Historical Provider, MD   venlafaxine 150 MG extended release tablet Take 150 mg by mouth daily (with breakfast)   Yes Historical Provider, MD   amLODIPine (NORVASC) 10 MG tablet Take 10 mg by mouth daily   Yes Historical Provider, MD   zolpidem (AMBIEN) 5 MG tablet Take 5 mg by mouth nightly.    Yes Historical Provider, MD   Saccharomyces boulardii (PROBIOTIC) 250 MG CAPS Take 250 mg by mouth daily   Yes Historical Provider, MD   vitamin D (CHOLECALCIFEROL) 1000 UNIT TABS tablet Take 1,000 Units by mouth daily   Yes Historical Provider, MD   vitamin E 400 UNIT capsule Take 400 Units by mouth daily   Yes Historical Provider, MD   Multiple Vitamins-Minerals (THERAPEUTIC MULTIVITAMIN-MINERALS) tablet Take 1 tablet by mouth nontender  EXTREMITIES: warm, well perfused, no edema. Joint with mildly reduced range of motion and generalized tenderness. Neurovascular exam normal  SKIN: warm, dry with no rashes or lesions  LYMPH: No cervical or inguinal lymphadenopathy    RADIOLOGY: xrays of extremity show right severe knee arthritis  LABORATORY:    CBC :    Lab Results   Component Value Date    WBC 7.3 01/29/2020    HGB 13.9 01/29/2020    HCT 44.5 01/29/2020     01/29/2020     BMP:   Lab Results   Component Value Date     01/29/2020    K 3.6 01/29/2020    K 5.0 11/01/2019     01/29/2020    CO2 23 01/29/2020    BUN 19 01/29/2020    CREATININE 0.7 01/29/2020    CALCIUM 10.4 01/29/2020    LABGLOM >60 01/29/2020    GLUCOSE 83 01/29/2020     PT/INR:    Lab Results   Component Value Date    PROTIME 12.6 01/29/2020    INR 1.00 01/29/2020     U/A:   Lab Results   Component Value Date    WBCUA 6-10 01/29/2020    RBCUA 3-5 01/29/2020    BACTERIA NONE 01/29/2020     HgBA1c:  No components found for: HGBA1C    IMPRESSION:   right primary knee osteoarthritis. Most recent office note reviewed and there has been no change in health status. PLAN: right Knee replacement. I explained to the patient/family the patient's diagnosis and operative procedure in detail. They said they understood basically what was wrong and how I planned to fix it. They understand the expected recovery and the risks which include excessive bleeding, infection, reaction to anesthesia, nerve injury, stiffness, fracture, deformity and dislocation. They then signed an operative consent form. Provider:  Cindy Pacheco  Date: 2/11/2020

## 2020-02-11 NOTE — ANESTHESIA PRE PROCEDURE
2 g in 0.9% sodium chloride 50 mL IVPB  2 g Intravenous On Call to OR Sumeet Medley MD        midazolam (VERSED) injection 2 mg  2 mg Intravenous Once Ric Mcrae MD           Allergies: Allergies   Allergen Reactions    Ciprofloxacin Rash       Problem List:    Patient Active Problem List   Diagnosis Code    Primary osteoarthritis of left knee M17.12    Arthritis of left knee M17.12       Past Medical History:        Diagnosis Date    Arthritis     Cancer ([address]     melanoma    Hypertension     Knee pain     Retention of urine     surgery before last; last total knee was better       Past Surgical History:        Procedure Laterality Date    CAROTID ENDARTERECTOMY Left     [address]    CARPAL TUNNEL RELEASE Left     HYSTERECTOMY      JOINT REPLACEMENT      ROTATOR CUFF REPAIR Bilateral     TOTAL KNEE ARTHROPLASTY Left 10/31/2019    LEFT TOTAL KNEE REPLACEMENT performed by Sumeet Medley MD at [address] [place] [address]         Social History:    Social History     Tobacco Use    Smoking status: Former Smoker     Years: 5.00     Types: Cigarettes     Last attempt to quit: 10/17/1990     Years since quittin.3    Smokeless tobacco: Never Used   Substance Use Topics    Alcohol use:  Yes     Alcohol/week: 2.0 standard drinks     Types: 1 Shots of liquor, 1 Glasses of wine per week     Comment: one drink per week avg                                Counseling given: Not Answered      Vital Signs (Current):   Vitals:    20 0552   BP: (!) 184/71   Pulse: 69   Resp: 18   Temp: 99.3 °F (37.4 °C)   TempSrc: Temporal   SpO2: 98%   Weight: 165 lb (74.8 kg)   Height: 5' 2\" (1.575 m)                                              BP Readings from Last 3 Encounters:   20 (!) 184/71   19 (!) 131/59   10/31/19 (!) 86/48       NPO Status: Time of last liquid consumption: 0000                        Time of last solid consumption: 0000

## 2020-02-12 VITALS
HEIGHT: 62 IN | HEART RATE: 95 BPM | WEIGHT: 165 LBS | SYSTOLIC BLOOD PRESSURE: 167 MMHG | TEMPERATURE: 99.2 F | DIASTOLIC BLOOD PRESSURE: 65 MMHG | RESPIRATION RATE: 18 BRPM | BODY MASS INDEX: 30.36 KG/M2 | OXYGEN SATURATION: 94 %

## 2020-02-12 LAB
ANION GAP SERPL CALCULATED.3IONS-SCNC: 13 MMOL/L (ref 7–19)
BUN BLDV-MCNC: 18 MG/DL (ref 8–23)
CALCIUM SERPL-MCNC: 8.9 MG/DL (ref 8.8–10.2)
CHLORIDE BLD-SCNC: 98 MMOL/L (ref 98–111)
CO2: 25 MMOL/L (ref 22–29)
CREAT SERPL-MCNC: 0.9 MG/DL (ref 0.5–0.9)
GFR NON-AFRICAN AMERICAN: >60
GLUCOSE BLD-MCNC: 180 MG/DL (ref 74–109)
HCT VFR BLD CALC: 30.7 % (ref 37–47)
HEMOGLOBIN: 9.8 G/DL (ref 12–16)
POTASSIUM REFLEX MAGNESIUM: 3.6 MMOL/L (ref 3.5–5)
SODIUM BLD-SCNC: 136 MMOL/L (ref 136–145)

## 2020-02-12 PROCEDURE — G0378 HOSPITAL OBSERVATION PER HR: HCPCS

## 2020-02-12 PROCEDURE — 85014 HEMATOCRIT: CPT

## 2020-02-12 PROCEDURE — 85018 HEMOGLOBIN: CPT

## 2020-02-12 PROCEDURE — 80048 BASIC METABOLIC PNL TOTAL CA: CPT

## 2020-02-12 PROCEDURE — 2700000000 HC OXYGEN THERAPY PER DAY

## 2020-02-12 PROCEDURE — 97165 OT EVAL LOW COMPLEX 30 MIN: CPT

## 2020-02-12 PROCEDURE — 97530 THERAPEUTIC ACTIVITIES: CPT

## 2020-02-12 PROCEDURE — 6370000000 HC RX 637 (ALT 250 FOR IP): Performed by: ORTHOPAEDIC SURGERY

## 2020-02-12 PROCEDURE — 2580000003 HC RX 258: Performed by: ORTHOPAEDIC SURGERY

## 2020-02-12 PROCEDURE — 36415 COLL VENOUS BLD VENIPUNCTURE: CPT

## 2020-02-12 RX ORDER — OXYCODONE HYDROCHLORIDE 5 MG/1
5 TABLET ORAL EVERY 4 HOURS PRN
Qty: 30 TABLET | Refills: 0 | Status: SHIPPED | OUTPATIENT
Start: 2020-02-12 | End: 2020-02-15

## 2020-02-12 RX ORDER — DOCUSATE SODIUM 100 MG/1
100 CAPSULE, LIQUID FILLED ORAL DAILY
Qty: 20 CAPSULE | Refills: 0 | Status: SHIPPED | OUTPATIENT
Start: 2020-02-12

## 2020-02-12 RX ORDER — CLONIDINE HYDROCHLORIDE 0.1 MG/1
0.1 TABLET ORAL EVERY 4 HOURS PRN
Status: DISCONTINUED | OUTPATIENT
Start: 2020-02-12 | End: 2020-02-12 | Stop reason: HOSPADM

## 2020-02-12 RX ORDER — ASPIRIN 325 MG
325 TABLET ORAL 2 TIMES DAILY
Qty: 60 TABLET | Refills: 0 | Status: SHIPPED | OUTPATIENT
Start: 2020-02-12

## 2020-02-12 RX ADMIN — VENLAFAXINE HYDROCHLORIDE 150 MG: 75 CAPSULE, EXTENDED RELEASE ORAL at 08:39

## 2020-02-12 RX ADMIN — PSYLLIUM HUSK 1 PACKET: 3.4 POWDER ORAL at 08:39

## 2020-02-12 RX ADMIN — SODIUM CHLORIDE, POTASSIUM CHLORIDE, SODIUM LACTATE AND CALCIUM CHLORIDE: 600; 310; 30; 20 INJECTION, SOLUTION INTRAVENOUS at 00:58

## 2020-02-12 RX ADMIN — SODIUM CHLORIDE, PRESERVATIVE FREE 10 ML: 5 INJECTION INTRAVENOUS at 09:00

## 2020-02-12 RX ADMIN — HYDROCHLOROTHIAZIDE 25 MG: 25 TABLET ORAL at 08:40

## 2020-02-12 RX ADMIN — ACETAMINOPHEN 650 MG: 325 TABLET ORAL at 08:39

## 2020-02-12 RX ADMIN — DOCUSATE SODIUM 100 MG: 100 CAPSULE, LIQUID FILLED ORAL at 08:40

## 2020-02-12 RX ADMIN — ASPIRIN 325 MG: 325 TABLET, COATED ORAL at 08:40

## 2020-02-12 RX ADMIN — MULTIPLE VITAMINS W/ MINERALS TAB 1 TABLET: TAB at 08:40

## 2020-02-12 RX ADMIN — SENNOSIDES AND DOCUSATE SODIUM 1 TABLET: 8.6; 5 TABLET ORAL at 08:40

## 2020-02-12 RX ADMIN — Medication 1 CAPSULE: at 08:40

## 2020-02-12 RX ADMIN — ACETAMINOPHEN 650 MG: 325 TABLET ORAL at 03:42

## 2020-02-12 RX ADMIN — VITAMIN D, TAB 1000IU (100/BT) 1000 UNITS: 25 TAB at 08:40

## 2020-02-12 RX ADMIN — OXYCODONE 10 MG: 5 TABLET ORAL at 03:42

## 2020-02-12 RX ADMIN — AMLODIPINE BESYLATE 10 MG: 10 TABLET ORAL at 08:39

## 2020-02-12 RX ADMIN — OXYCODONE 10 MG: 5 TABLET ORAL at 07:36

## 2020-02-12 ASSESSMENT — PAIN SCALES - GENERAL
PAINLEVEL_OUTOF10: 5
PAINLEVEL_OUTOF10: 8
PAINLEVEL_OUTOF10: 8
PAINLEVEL_OUTOF10: 6

## 2020-02-12 ASSESSMENT — PAIN DESCRIPTION - PAIN TYPE: TYPE: SURGICAL PAIN

## 2020-02-12 ASSESSMENT — PAIN DESCRIPTION - ORIENTATION: ORIENTATION: RIGHT

## 2020-02-12 ASSESSMENT — PAIN DESCRIPTION - LOCATION: LOCATION: KNEE

## 2020-02-12 NOTE — PROGRESS NOTES
Subjective:     Post-Operative Day: 1 NO complaints     Objective:     Patient Vitals for the past 24 hrs:   BP Temp Temp src Pulse Resp SpO2   02/12/20 0402 (!) 167/65 99.2 °F (37.3 °C) Temporal 95 18 94 %   02/11/20 2339 131/67 99.4 °F (37.4 °C) Temporal 93 18 92 %   02/11/20 2003 (!) 146/69 99.8 °F (37.7 °C) Temporal 84 18 92 %   02/11/20 1710 124/60 -- Temporal 77 16 95 %   02/11/20 1605 137/64 97.2 °F (36.2 °C) Temporal 69 16 94 %   02/11/20 1552 126/69 98 °F (36.7 °C) Temporal 90 16 91 %   02/11/20 1446 131/64 98.3 °F (36.8 °C) Temporal 68 18 91 %   02/11/20 1429 -- -- -- -- 18 97 %   02/11/20 0959 137/69 98 °F (36.7 °C) Temporal 60 20 96 %   02/11/20 0937 (!) 102/56 96.7 °F (35.9 °C) -- 62 16 95 %   02/11/20 0922 100/69 98.2 °F (36.8 °C) Temporal 63 18 96 %   02/11/20 0915 (!) 106/39 97.4 °F (36.3 °C) Temporal 63 13 94 %   02/11/20 0910 (!) 111/35 -- -- 65 14 94 %   02/11/20 0900 (!) 94/45 -- -- 67 12 93 %   02/11/20 0855 (!) 95/39 -- -- 72 15 93 %   02/11/20 0849 (!) 88/33 -- -- 69 13 92 %   02/11/20 0847 -- -- -- 72 -- --   02/11/20 0844 (!) 74/29 97.3 °F (36.3 °C) Temporal 72 16 92 %       General: alert, appears stated age and cooperative   Wound: Wound clean, dry and intact and mod swelling    Neurovascular: Exam normal   DVT Exam: No evidence of DVT seen on physical exam.         Data Review:  Recent Labs     02/12/20 0441   HGB 9.8*     Recent Labs     02/12/20 0441      K 3.6   CREATININE 0.9   GLUCOSE 180*     No results for input(s): POCGLU in the last 72 hours. No orders to display       Assessment:     Status Post right Total Knee Arthroplasty. Doing well postoperatively. . Acute postoperative blood loss anemia being monitored with daily hemoglobin/hematocrit.     Plan:     Pain control  Tight blood glucose control  PT/OT  DVT prophylaxis  Ice and elevate  Discharge Home today

## 2020-02-12 NOTE — CARE COORDINATION
Home Health referral received. Per [facility], patient is going outpatient for PT. Please contact Preet Escobar  at [phone] before any changes in discharge plan occurs. Will sign off.    Electronically signed by Rik Rae RN on 2/12/20 at 9:55 AM

## 2020-02-12 NOTE — CONSULTS
Referring Provider: Dr. Katelyn Jackson  Reason for Consultation: Medical management    Patient Care Team:  Joycelyn Starks as PCP - General    Chief complaint right knee pain    Subjective . History of present illness: The patient presents to the orthopedic service for right TKA. They have failed outpatient conservative treatment of NSAIDS, muscle relaxer, physical therapy and opioid pain meds. The pain is affecting their activities of daily living and they have chosen to undergo surgical correction. We have been consulted in the perioperative period due to the fact their Primary Care Provider does not attend here at [facility]. The postoperative pain is as expected. There are no other participating or relieving factors noted. Pleasant 28-year-old female patient of Dr. Johnnie Vidal suffers from degenerative osteoarthritis weightbearing joints. Previous left total knee replacement by Dr. Katelyn Jackson October 2019. Did well postoperatively other than some mild urinary retention. Postoperative pain is controlled, she is ready and willing to work with therapy in anticipation of discharge home soon.     REVIEW OF SYSTEMS:    CONSTITUTIONAL:  Negative for anorexia, chills, fevers, night sweats and weight loss  EYES:  negative for eye dryness, icterus and redness  HEENT:   negative for dental problems, epistaxis, facial trauma and thrush  RESPIRATORY:  negative for chest tightness, cough, dyspnea on exertion, pneumonia and sputum  CARDIOVASCULAR: negative for chest pain, dyspnea, exertional chest pressure/discomfort, irregular heart beat, palpitations, paroxysmal nocturnal dyspnea and syncope  GASTROINTESTINAL:  negative for abdominal pain, hematemesis, jaundice, melena and rectal bleeding  MUSCULOSKELETAL:  negative for muscle weakness, myalgias and neck pain  NEUROLOGICAL:   negative for dizziness, headaches, seizures, speech problems, tremors and vertigo  INTEGUMENT: negative for pruritus, rash, skin color change and skin lesion(s)   A Full 14 point review of systems is negative outside those listed above and in the HPI      History    Past Medical History:   Diagnosis Date    Arthritis     Brain aneurysm     Cancer ([address]     melanoma    Hypertension     Knee pain     Retention of urine     surgery before last; last total knee was better     Past Surgical History:   Procedure Laterality Date    CAROTID ENDARTERECTOMY Left     [address]    CARPAL TUNNEL RELEASE Left     HYSTERECTOMY      JOINT REPLACEMENT      ROTATOR CUFF REPAIR Bilateral     TOTAL KNEE ARTHROPLASTY Left 10/31/2019    LEFT TOTAL KNEE REPLACEMENT performed by Dwayne Michaud MD at [address] [place] [address] Right 2020    RIGHT TOTAL KNEE REPLACEMENT performed by Dwayne Michaud MD at [address] [place] [address]       Family History   Problem Relation Age of Onset    Stroke Mother     Cancer Father         brain    Cancer Brother      Social History     Tobacco Use    Smoking status: Former Smoker     Years: 5.00     Types: Cigarettes     Last attempt to quit: 10/17/1990     Years since quittin.3    Smokeless tobacco: Never Used   Substance Use Topics    Alcohol use: Yes     Alcohol/week: 2.0 standard drinks     Types: 1 Glasses of wine, 1 Shots of liquor per week     Comment: one drink per week avg    Drug use: Never     Medications Prior to Admission: aspirin 325 MG tablet, Take 325 mg by mouth daily  hydrochlorothiazide (HYDRODIURIL) 25 MG tablet, Take 25 mg by mouth daily  venlafaxine 150 MG extended release tablet, Take 150 mg by mouth daily (with breakfast)  amLODIPine (NORVASC) 10 MG tablet, Take 10 mg by mouth daily  zolpidem (AMBIEN) 5 MG tablet, Take 5 mg by mouth nightly.   Saccharomyces boulardii (PROBIOTIC) 250 MG CAPS, Take 250 mg by mouth daily  vitamin D (CHOLECALCIFEROL) 1000 UNIT TABS tablet, Take 1,000 Units by mouth daily  vitamin E 400 UNIT capsule, Take 400 Units by mouth ARNP/PA. I have seen and examined the patient and the key elements of all parts of the encounter have been performed by me. I agree with the assessment and plan as outlined by the ARNP/PA. Please refer to my separate note for complete documentation.      Electronically signed by Patricia Stoll MD on 2/12/2020 at 8:34 AM

## 2020-02-12 NOTE — PROGRESS NOTES
CLINICAL PHARMACY NOTE: MEDS TO [address] Select Patient?: No  Total # of Prescriptions Filled: 4   The following medications were delivered to the patient:  · Oxycodone 5mg  · Aspirin 325mg  · Stool softner 100mg   · Cyclobenzaprine 10mg  Total # of Interventions Completed: 0  Time Spent (min): 30    Additional Documentation:

## 2020-02-12 NOTE — DISCHARGE SUMMARY
[facility]  Dr. Les Perkins  Discharge Summary    The Elizabeth Goldman is a 70 y.o. female underwent total knee replacement procedure without complication. Elizabeth Goldman was admitted to the floor following her   recovery in the PACU.      Discharge Diagnosis   Right    Knee Replacement    Current Inpatient Medications    Current Facility-Administered Medications: cloNIDine (CATAPRES) tablet 0.1 mg, 0.1 mg, Oral, Q4H PRN  acetaminophen (TYLENOL) tablet 500 mg, 500 mg, Oral, Q6H PRN  amLODIPine (NORVASC) tablet 10 mg, 10 mg, Oral, Daily  hydrochlorothiazide (HYDRODIURIL) tablet 25 mg, 25 mg, Oral, Daily  therapeutic multivitamin-minerals 1 tablet, 1 tablet, Oral, Daily  psyllium (METAMUCIL) 58.12 % packet 1 packet, 1 packet, Oral, Daily  venlafaxine (EFFEXOR XR) extended release capsule 150 mg, 150 mg, Oral, Daily with breakfast  vitamin D (CHOLECALCIFEROL) tablet 1,000 Units, 1,000 Units, Oral, Daily  zolpidem (AMBIEN) tablet 5 mg, 5 mg, Oral, Nightly  0.9 % sodium chloride bolus, 500 mL, Intravenous, PRN  lactated ringers infusion, , Intravenous, Continuous  sodium chloride flush 0.9 % injection 10 mL, 10 mL, Intravenous, 2 times per day  sodium chloride flush 0.9 % injection 10 mL, 10 mL, Intravenous, PRN  acetaminophen (TYLENOL) tablet 650 mg, 650 mg, Oral, Q6H  oxyCODONE (ROXICODONE) immediate release tablet 5 mg, 5 mg, Oral, Q4H PRN **OR** oxyCODONE (ROXICODONE) immediate release tablet 10 mg, 10 mg, Oral, Q4H PRN  morphine injection 2 mg, 2 mg, Intravenous, Q1H PRN **OR** morphine injection 4 mg, 4 mg, Intravenous, Q1H PRN  docusate sodium (COLACE) capsule 100 mg, 100 mg, Oral, BID  sennosides-docusate sodium (SENOKOT-S) 8.6-50 MG tablet 1 tablet, 1 tablet, Oral, BID  magnesium hydroxide (MILK OF MAGNESIA) 400 MG/5ML suspension 30 mL, 30 mL, Oral, Daily PRN  ondansetron (ZOFRAN) injection 4 mg, 4 mg, Intravenous, Q6H PRN  ibuprofen (ADVIL;MOTRIN) tablet 400 mg, 400 mg, Oral, TID WC  aspirin EC tablet 325 mg, 325 mg, Oral, BID  lactobacillus (CULTURELLE) capsule 1 capsule, 1 capsule, Oral, Daily  cyclobenzaprine (FLEXERIL) tablet 10 mg, 10 mg, Oral, TID PRN    Post-operatively the patients diet was advanced as tolerated and their incision was checked on POD #1. The incision was clean, dry and intact with no signs of infection. The patient remained neurovascularly intact in the lower extremity and had intact pulses distally. Patients calf remained soft and showed no evidence of DVT. The patient was able to move their leg and ankle/foot without any problems post-operatively. Physical therapy and occupational therapy were consulted and began working with the patient post-operatively. The patient progressed with PT/OT as would be expected and continued to improve through their stay. The patients pain was initially controlled with IV medications but we were able to transition to oral pain medications soon after arrival to the floor and their pain remained under good control through their hospital stay. From a medical standpoint the patient remained stable and continued to have the medicine team follow throughout their stay. Acute postoperative blood loss anemia after joint replacement being monitored with daily hemoglobin/hematocrit. The patients dressing was changed/incison was checked on day of d/c. The patient will be discharged at this time to  Home   with their current diet restrictions and will continue to follow the total knee precautions outlined to them by us and PT/OT. Condition on Discharge: Stable    Plan  Followup at scheduled appointment time (1 month post-op). Patient was instructed on the use of pain medications, the signs and symptoms of infection, and was given our number to call should they have any questions or concerns following discharge.

## 2020-02-17 ENCOUNTER — TELEPHONE (OUTPATIENT)
Dept: INPATIENT UNIT | Age: 80
End: 2020-02-17

## (undated) DEVICE — SUTURE VCRL SZ 3-0 L27IN ABSRB UD L19MM PS-2 3/8 CIR PRIM J427H

## (undated) DEVICE — CHLORAPREP 26ML ORANGE

## (undated) DEVICE — GLOVE SURG SZ 85 L12IN FNGR THK13MIL BRN LTX SYN POLYMER W

## (undated) DEVICE — CEMENT MIXING SYSTEM WITH FEMORAL BREAKWAY NOZZLE: Brand: REVOLUTION

## (undated) DEVICE — GLOVE SURG SZ 85 L12IN FNGR THK79MIL GRN LTX FREE

## (undated) DEVICE — SHEET,DRAPE,53X77,STERILE: Brand: MEDLINE

## (undated) DEVICE — GLOVE SURG SZ 85 L12IN FNGR THK94MIL TRNSLUC YEL LTX

## (undated) DEVICE — DUAL CUT SAGITTAL BLADE

## (undated) DEVICE — RECIPROCATING BLADE DOUBLE SIDE (74.0 X 0.77MM)

## (undated) DEVICE — SOLUTION IV IRRIG WATER 1000ML POUR BRL 2F7114

## (undated) DEVICE — SOLUTION IV IRRIG POUR BRL 0.9% SODIUM CHL 2F7124

## (undated) DEVICE — SOLUTION IRRIG 1000ML 09% SOD CHL USP PIC PLAS CONTAINER

## (undated) DEVICE — SUTURE ABSRB BRAID COAT UD CP NO 2 27IN VCRL J195H

## (undated) DEVICE — DECANTER VI VENT W/ VLV FOR ASEP TRNSF OF FLD

## (undated) DEVICE — SYSTEM SKIN CLSR 22CM DERMBND PRINEO

## (undated) DEVICE — DRESSING FOAM W4XL12IN SIL RECT ADH WTRPRF FLM BK W/ BORD

## (undated) DEVICE — TRAY EPI 25GA L3.5IN 0.75% BIPIVCAIN 8.25% D CONTAIN BPA

## (undated) DEVICE — SURGICAL PROCEDURE PACK KNEE TOT DBD CDS LOURDES HOSP LF

## (undated) DEVICE — SUTURE VCRL SZ 2-0 L36IN ABSRB UD L36MM CT-1 1/2 CIR J945H

## (undated) DEVICE — ZIMMER® STERILE DISPOSABLE TOURNIQUET CUFF WITH PLC, DUAL PORT, SINGLE BLADDER, 34 IN. (86 CM)

## (undated) DEVICE — Z INACTIVE USE 2660664 SOLUTION IRRIG 3000ML 0.9% SOD CHL USP UROMATIC PLAS CONT

## (undated) DEVICE — BANDAGE COMPR W3INXL15FT BGE E SGL LAYERED CLP CLSR

## (undated) DEVICE — BLADE SURG SAW RECIP S STL DBL SIDE 70MM LEN 12.5MM CUT 0277096278] STRYKER INSTRUMENT DIV]